# Patient Record
Sex: FEMALE | Race: WHITE | NOT HISPANIC OR LATINO | Employment: OTHER | URBAN - METROPOLITAN AREA
[De-identification: names, ages, dates, MRNs, and addresses within clinical notes are randomized per-mention and may not be internally consistent; named-entity substitution may affect disease eponyms.]

---

## 2020-08-01 ENCOUNTER — OFFICE VISIT (OUTPATIENT)
Dept: URGENT CARE | Facility: URGENT CARE | Age: 70
End: 2020-08-01
Payer: COMMERCIAL

## 2020-08-01 VITALS
OXYGEN SATURATION: 96 % | DIASTOLIC BLOOD PRESSURE: 70 MMHG | SYSTOLIC BLOOD PRESSURE: 150 MMHG | HEART RATE: 115 BPM | TEMPERATURE: 99.5 F

## 2020-08-01 DIAGNOSIS — L23.9 ALLERGIC CONTACT DERMATITIS, UNSPECIFIED TRIGGER: ICD-10-CM

## 2020-08-01 DIAGNOSIS — L08.9 LOCAL INFECTION OF SKIN AND SUBCUTANEOUS TISSUE: Primary | ICD-10-CM

## 2020-08-01 DIAGNOSIS — I10 ESSENTIAL HYPERTENSION: ICD-10-CM

## 2020-08-01 PROCEDURE — 99203 OFFICE O/P NEW LOW 30 MIN: CPT | Performed by: FAMILY MEDICINE

## 2020-08-01 RX ORDER — AMLODIPINE BESYLATE 10 MG/1
TABLET ORAL
COMMUNITY
Start: 2020-05-04 | End: 2021-02-05

## 2020-08-01 RX ORDER — OXYCODONE HYDROCHLORIDE 5 MG/1
TABLET ORAL
COMMUNITY
Start: 2020-07-25 | End: 2021-02-05

## 2020-08-01 RX ORDER — CEPHALEXIN 500 MG/1
500 CAPSULE ORAL 4 TIMES DAILY
Qty: 28 CAPSULE | Refills: 0 | Status: SHIPPED | OUTPATIENT
Start: 2020-08-01 | End: 2020-08-08

## 2020-08-01 RX ORDER — TRIAMCINOLONE ACETONIDE 1 MG/G
CREAM TOPICAL 2 TIMES DAILY
Qty: 28.4 G | Refills: 0 | Status: ON HOLD | OUTPATIENT
Start: 2020-08-01 | End: 2021-03-01

## 2020-08-01 RX ORDER — OXYCODONE HYDROCHLORIDE 5 MG/1
5 TABLET ORAL
COMMUNITY
Start: 2020-07-25 | End: 2021-02-05

## 2020-08-01 RX ORDER — ASPIRIN 81 MG/1
81 TABLET ORAL DAILY
Status: ON HOLD | COMMUNITY
End: 2022-07-13

## 2020-08-01 RX ORDER — TELMISARTAN AND HYDROCHLORTHIAZIDE 80; 12.5 MG/1; MG/1
2 TABLET ORAL EVERY MORNING
COMMUNITY
Start: 2020-07-08

## 2020-08-01 RX ORDER — IBUPROFEN 200 MG
400-600 TABLET ORAL
Status: ON HOLD | COMMUNITY
End: 2022-07-12

## 2020-08-01 RX ORDER — ATORVASTATIN CALCIUM 80 MG/1
80 TABLET, FILM COATED ORAL EVERY EVENING
COMMUNITY
Start: 2019-05-06

## 2020-08-01 RX ORDER — METOPROLOL TARTRATE 50 MG
75 TABLET ORAL 2 TIMES DAILY
COMMUNITY
Start: 2020-05-27

## 2020-08-02 NOTE — PROGRESS NOTES
HPI:Jluis Duke is a 69 year old female here today with complaint of concerns of infection  Patient had cholecystectomy > 1 week ago.  Patient states there has been some redness around the incision.   She saw her doctor for a follow up a few days ago was told likely allergic reaction possibly to the glue     Today seemed more red and bigger.  Because of this patient came in to be seen   Has tried supportive treatment no relief  Because of persistent symptoms patient was brought in to be seen    BP elevated today but asymptomatic. No headache no blurring of vision no chest pain no shortness of breath no dizziness no unsteadiness no numbness no weakness'    Allergies   Allergen Reactions     Codeine Other (See Comments)     Pasted out  Pasted out         No past medical history on file.    amLODIPine (NORVASC) 10 MG tablet, Take 1 tablet by mouth once daily  aspirin 81 MG EC tablet, Take 81 mg by mouth  atorvastatin (LIPITOR) 80 MG tablet, Take 80 mg by mouth  ibuprofen (ADVIL/MOTRIN) 200 MG tablet, Take 400-600 mg by mouth  metoprolol tartrate (LOPRESSOR) 50 MG tablet, TAKE 1 & 1/2 (ONE & ONE-HALF) TABLETS BY MOUTH TWICE DAILY  oxyCODONE (ROXICODONE) 5 MG tablet, Take 5 mg by mouth  oxyCODONE (ROXICODONE) 5 MG tablet,   telmisartan-HCTZ (MICARDIS HCT) 80-12.5 MG tablet, Take 2 tablets by mouth    No current facility-administered medications on file prior to visit.       Social History     Tobacco Use     Smoking status: Not on file   Substance Use Topics     Alcohol use: Not on file     Drug use: Not on file       ROS:  review of systems negative except for noted above.        OBJECTIVE:  BP (!) 150/70   Pulse 115   Temp 99.5  F (37.5  C) (Tympanic)   SpO2 96%    General:   awake, alert, and cooperative.  NAD.   Head: Normocephalic, atraumatic.  Eyes: Conjunctiva clear,   Neuro: Alert and oriented - normal speech.  MS: Using extremities freely  PSYCH:  Normal affect, normal speech  SKIN: erythema warm plaques  around her incision sites about 1-2 cm peripherally, some papules on this plaque small pinpoint and fluid filled, per patient very itchy  No purulent discharge, No fluctuation.     ASSESSMENT:    ICD-10-CM    1. Local infection of skin and subcutaneous tissue  L08.9 cephALEXin (KEFLEX) 500 MG capsule   2. Allergic contact dermatitis, unspecified trigger  L23.9 triamcinolone (KENALOG) 0.1 % external cream   3. Essential hypertension  I10        PLAN:   Suspect contact derm possibly secondarily infected   Prescribed with above  Supportive treatment    Watch for any worsening redness warmth swelling tenderness or inflammation or purulent discharge. If with any of these please come in immediately to be re-evaluated  Please come in immediately also if with any fever or chills  Adverse reactions of medications discussed.  Over the counter medications discussed.   Aware to come back in if with worsening symptoms or if no relief despite treatment plan  Patient voiced understanding and had no further questions.     Patient states she is very anxious today and rushed to make it before close  Your blood pressure reading was elevated today.  Recommend that you have it re-checked either at home or at our clinic within a week  Avoid cold medicines that have pseudoephedrin in it, or Sudafed. You may take regular or plain Robitussin or Mucinex  If you are unsure, please ask the pharmacist    If your blood pressure top number (systolic) 180 and above, or the bottom number (diastolic) 120 and above, you need to be seen immediately.  If persistently elevated top number 140 and above, or the bottom number 90 and above, please schedule an appointment to see a provider in clinic within a week.  If you have very elevated blood pressures, accompanied by headache, chest pain, numbness, weakness, slurring of speech, confusion, difficulty walking, call 911 and go to the ER      Follow up:  surgeon in 3-4 days for incision check  Advised about  symptoms which might herald more serious problems.        Dianna Spring MD

## 2021-01-17 DIAGNOSIS — Z11.59 ENCOUNTER FOR SCREENING FOR OTHER VIRAL DISEASES: ICD-10-CM

## 2021-02-05 RX ORDER — EZETIMIBE 10 MG/1
10 TABLET ORAL EVERY MORNING
COMMUNITY

## 2021-02-06 DIAGNOSIS — Z11.59 ENCOUNTER FOR SCREENING FOR OTHER VIRAL DISEASES: ICD-10-CM

## 2021-02-06 LAB
SARS-COV-2 RNA RESP QL NAA+PROBE: NORMAL
SPECIMEN SOURCE: NORMAL

## 2021-02-06 PROCEDURE — U0003 INFECTIOUS AGENT DETECTION BY NUCLEIC ACID (DNA OR RNA); SEVERE ACUTE RESPIRATORY SYNDROME CORONAVIRUS 2 (SARS-COV-2) (CORONAVIRUS DISEASE [COVID-19]), AMPLIFIED PROBE TECHNIQUE, MAKING USE OF HIGH THROUGHPUT TECHNOLOGIES AS DESCRIBED BY CMS-2020-01-R: HCPCS | Performed by: OPHTHALMOLOGY

## 2021-02-06 PROCEDURE — U0005 INFEC AGEN DETEC AMPLI PROBE: HCPCS | Performed by: OPHTHALMOLOGY

## 2021-02-07 LAB
LABORATORY COMMENT REPORT: NORMAL
SARS-COV-2 RNA RESP QL NAA+PROBE: NEGATIVE
SPECIMEN SOURCE: NORMAL

## 2021-02-09 ENCOUNTER — ANESTHESIA EVENT (OUTPATIENT)
Dept: SURGERY | Facility: CLINIC | Age: 71
End: 2021-02-09
Payer: COMMERCIAL

## 2021-02-09 NOTE — ANESTHESIA PREPROCEDURE EVALUATION
Anesthesia Pre-Procedure Evaluation    Patient: Jluis Duke   MRN: [id number] : 1950        Preoperative Diagnosis: Cataract [H26.9]   Procedure : Procedure(s):  Cataract removal with implant.     No past medical history on file.   No past surgical history on file.   Allergies   Allergen Reactions     Codeine Other (See Comments)     Pasted out  Pasted out        Social History     Tobacco Use     Smoking status: Not on file   Substance Use Topics     Alcohol use: Not on file      Wt Readings from Last 1 Encounters:   No data found for Wt        Anesthesia Evaluation   Pt has had prior anesthetic. Type: General.    No history of anesthetic complications       ROS/MED HX  ENT/Pulmonary: Comment: Hearing loss    (+) sleep apnea, uses CPAP,     Neurologic:  - neg neurologic ROS     Cardiovascular:     (+) Dyslipidemia hypertension--CAD ---    METS/Exercise Tolerance:     Hematologic:  - neg hematologic  ROS     Musculoskeletal:   (+) arthritis,     GI/Hepatic:  - neg GI/hepatic ROS  (-) esophageal disease   Renal/Genitourinary:  - neg Renal ROS     Endo: Comment: Super morbid obesity    (+) type II DM, Not using insulin, - not using insulin pump. Obesity (Extreme morbid),     Psychiatric/Substance Use:  - neg psychiatric ROS     Infectious Disease:  - neg infectious disease ROS     Malignancy:  - neg malignancy ROS     Other:  - neg other ROS          Physical Exam    Airway        Mallampati: II   TM distance: > 3 FB   Neck ROM: full   Mouth opening: > 3 cm    Respiratory Devices and Support         Dental  no notable dental history         Cardiovascular   cardiovascular exam normal          Pulmonary   pulmonary exam normal                OUTSIDE LABS:  CBC: No results found for: WBC, HGB, HCT, PLT  BMP: No results found for: NA, POTASSIUM, CHLORIDE, CO2, BUN, CR, GLC  COAGS: No results found for: PTT, INR, FIBR  POC: No results found for: BGM, HCG, HCGS  HEPATIC: No results found for: ALBUMIN,  PROTTOTAL, ALT, AST, GGT, ALKPHOS, BILITOTAL, BILIDIRECT, DAVE  OTHER: No results found for: PH, LACT, A1C, THELMA, PHOS, MAG, LIPASE, AMYLASE, TSH, T4, T3, CRP, SED    Anesthesia Plan    ASA Status:  3   NPO Status:  NPO Appropriate    Anesthesia Type: MAC.     - Reason for MAC: Difficulty with conscious sedation (QS), Procedure to face, neck, head or breast      Maintenance: Balanced.        Consents    Anesthesia Plan(s) and associated risks, benefits, and realistic alternatives discussed. Questions answered and patient/representative(s) expressed understanding.     - Discussed with:  Patient         Postoperative Care            Comments:                LANDON Pryor CRNA

## 2021-02-10 ENCOUNTER — ANESTHESIA (OUTPATIENT)
Dept: SURGERY | Facility: CLINIC | Age: 71
End: 2021-02-10
Payer: COMMERCIAL

## 2021-02-10 ENCOUNTER — HOSPITAL ENCOUNTER (OUTPATIENT)
Facility: CLINIC | Age: 71
Discharge: HOME | End: 2021-02-10
Attending: OPHTHALMOLOGY | Admitting: OPHTHALMOLOGY
Payer: COMMERCIAL

## 2021-02-10 VITALS
SYSTOLIC BLOOD PRESSURE: 137 MMHG | DIASTOLIC BLOOD PRESSURE: 74 MMHG | TEMPERATURE: 98.1 F | HEIGHT: 63 IN | HEART RATE: 66 BPM | WEIGHT: 288 LBS | BODY MASS INDEX: 51.03 KG/M2 | OXYGEN SATURATION: 96 % | RESPIRATION RATE: 16 BRPM

## 2021-02-10 PROCEDURE — 258N000003 HC RX IP 258 OP 636: Performed by: NURSE ANESTHETIST, CERTIFIED REGISTERED

## 2021-02-10 PROCEDURE — 250N000011 HC RX IP 250 OP 636: Performed by: OPHTHALMOLOGY

## 2021-02-10 PROCEDURE — 250N000011 HC RX IP 250 OP 636: Performed by: NURSE ANESTHETIST, CERTIFIED REGISTERED

## 2021-02-10 PROCEDURE — 761N000008 HC RECOVERY CATRACT PACKAGE: Performed by: OPHTHALMOLOGY

## 2021-02-10 PROCEDURE — V2632 POST CHMBR INTRAOCULAR LENS: HCPCS | Performed by: OPHTHALMOLOGY

## 2021-02-10 PROCEDURE — 360N000007 HC CATARACT SURGICAL PACKAGE: Performed by: OPHTHALMOLOGY

## 2021-02-10 PROCEDURE — 250N000009 HC RX 250: Performed by: OPHTHALMOLOGY

## 2021-02-10 PROCEDURE — 370N000004 HC ANESTHESIA CATARACT PACKAGE: Performed by: OPHTHALMOLOGY

## 2021-02-10 DEVICE — EYE IMP IOL AMO PCL TECNIS ZCB00 19.0: Type: IMPLANTABLE DEVICE | Site: EYE | Status: FUNCTIONAL

## 2021-02-10 RX ORDER — TROPICAMIDE 10 MG/ML
1 SOLUTION/ DROPS OPHTHALMIC
Status: COMPLETED | OUTPATIENT
Start: 2021-02-10 | End: 2021-02-10

## 2021-02-10 RX ORDER — PROPARACAINE HYDROCHLORIDE 5 MG/ML
SOLUTION/ DROPS OPHTHALMIC PRN
Status: DISCONTINUED | OUTPATIENT
Start: 2021-02-10 | End: 2021-02-10 | Stop reason: HOSPADM

## 2021-02-10 RX ORDER — LIDOCAINE 40 MG/G
CREAM TOPICAL
Status: DISCONTINUED | OUTPATIENT
Start: 2021-02-10 | End: 2021-02-10 | Stop reason: HOSPADM

## 2021-02-10 RX ORDER — LIDOCAINE HYDROCHLORIDE 20 MG/ML
JELLY TOPICAL PRN
Status: DISCONTINUED | OUTPATIENT
Start: 2021-02-10 | End: 2021-02-10 | Stop reason: HOSPADM

## 2021-02-10 RX ORDER — BALANCED SALT SOLUTION 6.4; .75; .48; .3; 3.9; 1.7 MG/ML; MG/ML; MG/ML; MG/ML; MG/ML; MG/ML
SOLUTION OPHTHALMIC PRN
Status: DISCONTINUED | OUTPATIENT
Start: 2021-02-10 | End: 2021-02-10 | Stop reason: HOSPADM

## 2021-02-10 RX ORDER — SODIUM CHLORIDE, SODIUM LACTATE, POTASSIUM CHLORIDE, CALCIUM CHLORIDE 600; 310; 30; 20 MG/100ML; MG/100ML; MG/100ML; MG/100ML
INJECTION, SOLUTION INTRAVENOUS CONTINUOUS
Status: DISCONTINUED | OUTPATIENT
Start: 2021-02-10 | End: 2021-02-10 | Stop reason: HOSPADM

## 2021-02-10 RX ADMIN — TROPICAMIDE 1 DROP: 10 SOLUTION/ DROPS OPHTHALMIC at 09:04

## 2021-02-10 RX ADMIN — SODIUM CHLORIDE, POTASSIUM CHLORIDE, SODIUM LACTATE AND CALCIUM CHLORIDE: 600; 310; 30; 20 INJECTION, SOLUTION INTRAVENOUS at 09:48

## 2021-02-10 RX ADMIN — CYCLOPENTOLATE HYDROCHLORIDE AND PHENYLEPHRINE HYDROCHLORIDE 1 DROP: 2; 10 SOLUTION/ DROPS OPHTHALMIC at 09:13

## 2021-02-10 RX ADMIN — CYCLOPENTOLATE HYDROCHLORIDE AND PHENYLEPHRINE HYDROCHLORIDE 1 DROP: 2; 10 SOLUTION/ DROPS OPHTHALMIC at 09:21

## 2021-02-10 RX ADMIN — MIDAZOLAM 2 MG: 1 INJECTION INTRAMUSCULAR; INTRAVENOUS at 09:50

## 2021-02-10 RX ADMIN — CYCLOPENTOLATE HYDROCHLORIDE AND PHENYLEPHRINE HYDROCHLORIDE 1 DROP: 2; 10 SOLUTION/ DROPS OPHTHALMIC at 09:04

## 2021-02-10 RX ADMIN — TROPICAMIDE 1 DROP: 10 SOLUTION/ DROPS OPHTHALMIC at 09:13

## 2021-02-10 RX ADMIN — TROPICAMIDE 1 DROP: 10 SOLUTION/ DROPS OPHTHALMIC at 09:21

## 2021-02-10 ASSESSMENT — MIFFLIN-ST. JEOR: SCORE: 1777.55

## 2021-02-10 NOTE — ANESTHESIA CARE TRANSFER NOTE
Patient: Jluis HASSAN Srikanth    Procedure(s):  Cataract removal with implant.    Diagnosis: Cataract [H26.9]  Diagnosis Additional Information: No value filed.    Anesthesia Type:   MAC     Note:    Oropharynx: oropharynx clear of all foreign objects  Level of Consciousness: awake  Oxygen Supplementation: room air    Independent Airway: airway patency satisfactory and stable  Dentition: dentition unchanged  Vital Signs Stable: post-procedure vital signs reviewed and stable  Report to RN Given: handoff report given  Patient transferred to: Phase II          Vitals: (Last set prior to Anesthesia Care Transfer)  CRNA VITALS  2/10/2021 0923 - 2/10/2021 0953      2/10/2021             Pulse:  76    SpO2:  95 %        Electronically Signed By: LANDON Luis CRNA  February 10, 2021  9:53 AM

## 2021-02-10 NOTE — OP NOTE
CATARACT OPERATIVE NOTE    PATIENT: Jluis Duke  DATE OF SURGERY: 2/10/2021  PREOPERATIVE DIAGNOSIS:  Senile Nuclear Cataract, Left eye  POSTOPERATIVE DIAGNOSIS:  Senile Nuclear Cataract, Left eye  OPERATIVE PROCEDURE:  Phacoemulsification and placement of intraocular lens  SURGEON:  Loyd Marquez MD  ANESTHESIA:  Topical / MAC  EBL:  None  SPECIMENS:  None  COMPLICATIONS:  None    PROCEDURE:  The patient was brought to the operating room at [facility].  The left eye was prepped and draped in the usual fashion for cataract surgery.  A wire lid speculum was inserted.  A super sharp blade was used to make a paracentesis at the 5 O'clock position.  The super sharp blade was used to make a partial thickness temporal groove, which was 3 mm in length.  0.8 mL of non-preserved epi-Shugarcaine was injected into the anterior chamber.  Viscoelastic was used to inflate the anterior chamber through a cannula.  A 2.5 mm microkeratome was used to make a temporal clear corneal incision in a two-plane fashion.  A cystotome needle and forceps were used to make a capsulorrhexis.  Hydrodissection and hydrodelineation were performed with Balance Salt Solution.  The lens was then phacoemulsified and removed without complications.  The cortical material was removed with bimanual irrigation and aspiration.  The capsular bag was filled with viscoelastic.  A posterior chamber intraocular lens, preselected and recorded, was folded and inserted into the capsular bag.  The viscoelastic was removed with the irrigation and aspiration tip.  Balanced Salt Solution with Vigamox, 150mg/0.1mL, was used to refill the anterior chamber.  The wounds were checked for water tightness and required no suture.  The wire lid speculum was removed.  The patient's left eye was cleaned and a drop of each post-operative drop was placed, followed by a orantes shield.  The patient tolerated the procedure well, and there were no  complications.      Loyd Marquez MD

## 2021-02-10 NOTE — H&P
[facility]  TOTAL EYE CARE  [address]  [facility] [address]  [phone]  Dept: [phone]    OPHTHALMOLOGY PRE-OPERATIVE  HISTORY AND PHYSICAL    DATE OF H/P:  2021    DATE OF SURGERY:  February 10, 2021  PROCEDURE:  Procedure(s):  Cataract removal with implant., Left Eye  LENS IMPLANT:  [id number] +19.0  REFRACTIVE GOAL:  PL Sph  SURGEON:  Loyd Marquez MD    ANESTHESIA:  TOPICAL / MAC    OR CASE REQUIREMENTS:    DEMOGRAPHICS:  Demographic Information on Jluis Duke:    Jluis Duke  Gender: female  : 1950  [address] [address] [address]  [phone] (home)     Medical Record: [id number]  Social Security Number: [id number]  Pharmacy: [facility]  [address]  Primary Care Provider: Olivia Aviles    Parent's names are: Data Unavailable (mother) and Data Unavailable (father).    Insurance: Payor: [facility] / Plan: BCBS MEDICARE ADVANTAGE / Product Type: Medicare /     OCULAR HISTORY:  Cataracts, each eye.    HISTORIES:  Type II diabetes, sleep apnea.    No past medical history on file.    No past surgical history on file.    No family history on file.    Social History     Tobacco Use     Smoking status: Not on file   Substance Use Topics     Alcohol use: Not on file       MEDICATIONS:  No current facility-administered medications for this encounter.      Current Outpatient Medications   Medication Sig     empagliflozin (JARDIANCE) 10 MG TABS tablet Take 10 mg by mouth daily     ezetimibe (ZETIA) 10 MG tablet Take 10 mg by mouth daily     aspirin 81 MG EC tablet Take 81 mg by mouth     atorvastatin (LIPITOR) 80 MG tablet Take 80 mg by mouth     ibuprofen (ADVIL/MOTRIN) 200 MG tablet Take 400-600 mg by mouth     metoprolol tartrate (LOPRESSOR) 50 MG tablet TAKE 1 & 1/2 (ONE & ONE-HALF) TABLETS BY MOUTH TWICE DAILY     telmisartan-HCTZ (MICARDIS HCT) 80-12.5 MG tablet Take 2 tablets by mouth      triamcinolone (KENALOG) 0.1 % external cream Apply topically 2 times daily For up to 2 weeks or until 2 days after clear       ALLERGIES:     Allergies   Allergen Reactions     Codeine Other (See Comments)     Pasted out  Pasted out         PERTINENT SYSTEMS REVIEW:    1. No - Do you have a history of heart attack, stroke, stent, bypass or surgery on an artery in the head, neck, heart or legs?  2. No - Do you ever have any pain or discomfort in your chest?  3. No - Do you have a history of  Heart Failure?  4. No - Are you troubled by shortness of breath when walking: On the level, up a slight hill or at night?  5. No - Do you currently have a cold, bronchitis or other respiratory infection?  6. No - Do you have a cough, shortness of breath or wheezing?  7. No - Do you sometimes get pains in the calves of your legs when you walk?  8. No - Do you or anyone in your family have previous history of blood clots?  9. No - Do you or does anyone in your family have a serious bleeding problem such as prolonged bleeding following surgeries or cuts?  10. No - Have you ever had problems with anemia or been told to take iron pills?  11. No - Have you had any abnormal blood loss such as black, tarry or bloody stools, or abnormal vaginal bleeding?  12. No - Have you ever had a blood transfusion?  13. No - Have you or any of your relatives ever had problems with anesthesia?  14. Yes: CPAP - Do you have sleep apnea, excessive snoring or daytime drowsiness?  15. No - Do you have any prosthetic heart valves?  16. Yes: knee replacements - Do you have prosthetic joints?    EXAMINATION:  Vitals were reviewed                     Vison:  Va, right - 20/25, left - 20/40;   BAT, right - 20/70, left - 20/100;  HEENT:  Cataract, otherwise unremarkable.  LUNGS:  Clear  CV:  Regular rate and rhythm without murmur  ABD:  Soft and nontender  NEURO:  Alert and nonfocal    IMPRESSION:  Patient cleared for ophthalmic surgery.  Low risk with monitored,  light sedation.  I have assessed the patient's DVT risk, and no additional orders necessary.    PLAN:  Procedure(s):  Cataract removal with implant., Left Eye      Loyd Marquez MD

## 2021-02-10 NOTE — ANESTHESIA POSTPROCEDURE EVALUATION
Patient: Jluis Duke    Procedure(s):  Cataract removal with implant.    Diagnosis:Cataract [H26.9]  Diagnosis Additional Information: No value filed.    Anesthesia Type:  MAC    Note:  Disposition: Outpatient   Postop Pain Control: Uneventful            Sign Out: Well controlled pain   PONV: No   Neuro/Psych: Uneventful            Sign Out: Acceptable/Baseline neuro status   Airway/Respiratory:    CV/Hemodynamics: Uneventful            Sign Out: Acceptable CV status   Other NRE: NONE   DID A NON-ROUTINE EVENT OCCUR?          Last vitals:  Vitals:    02/10/21 0700   BP: (!) 156/82   Pulse: 69   Resp: 16   Temp: 37.1  C (98.8  F)   SpO2: 96%       Last vitals prior to Anesthesia Care Transfer:  CRNA VITALS  2/10/2021 0924 - 2/10/2021 0954      2/10/2021             Pulse:  76    SpO2:  95 %          Electronically Signed By: LANDON Luis CRNA  February 10, 2021  9:54 AM

## 2021-02-14 DIAGNOSIS — Z11.59 ENCOUNTER FOR SCREENING FOR OTHER VIRAL DISEASES: ICD-10-CM

## 2021-02-23 ENCOUNTER — ANESTHESIA EVENT (OUTPATIENT)
Dept: SURGERY | Facility: CLINIC | Age: 71
End: 2021-02-23
Payer: COMMERCIAL

## 2021-02-23 NOTE — ANESTHESIA PREPROCEDURE EVALUATION
Anesthesia Pre-Procedure Evaluation    Patient: Jluis Duke   MRN: [id number] : 1950        Preoperative Diagnosis: Cataract [H26.9]   Procedure : Procedure(s):  Cataract removal with implant.     Past Medical History:   Diagnosis Date     Hypertension       Past Surgical History:   Procedure Laterality Date     CHOLECYSTECTOMY       ORTHOPEDIC SURGERY       PHACOEMULSIFICATION WITH STANDARD INTRAOCULAR LENS IMPLANT Left 2/10/2021    Procedure: Cataract removal with implant.;  Surgeon: Loyd Marquez MD;  Location: [facility] OR      Allergies   Allergen Reactions     Codeine Other (See Comments)     Pasted out  Pasted out        Social History     Tobacco Use     Smoking status: Never Smoker     Smokeless tobacco: Never Used   Substance Use Topics     Alcohol use: Not Currently      Wt Readings from Last 1 Encounters:   02/10/21 130.6 kg (288 lb)        Anesthesia Evaluation   Pt has had prior anesthetic. Type: General.    No history of anesthetic complications       ROS/MED HX  ENT/Pulmonary: Comment: Hearing loss    (+) sleep apnea, uses CPAP,     Neurologic:  - neg neurologic ROS     Cardiovascular:     (+) Dyslipidemia hypertension--CAD ---    METS/Exercise Tolerance:     Hematologic:  - neg hematologic  ROS     Musculoskeletal:   (+) arthritis,     GI/Hepatic:  - neg GI/hepatic ROS   (+) cholecystitis/cholelithiasis,  (-) esophageal disease   Renal/Genitourinary:  - neg Renal ROS     Endo: Comment: Super morbid obesity    (+) type II DM, Not using insulin, - not using insulin pump. Obesity (Extreme morbid),     Psychiatric/Substance Use:  - neg psychiatric ROS     Infectious Disease:  - neg infectious disease ROS     Malignancy:  - neg malignancy ROS     Other:  - neg other ROS          Physical Exam    Airway        Mallampati: I   TM distance: > 3 FB   Neck ROM: full   Mouth opening: > 3 cm    Respiratory Devices and Support         Dental  no notable dental history         Cardiovascular    cardiovascular exam normal          Pulmonary   pulmonary exam normal                OUTSIDE LABS:  CBC: No results found for: WBC, HGB, HCT, PLT  BMP: No results found for: NA, POTASSIUM, CHLORIDE, CO2, BUN, CR, GLC  COAGS: No results found for: PTT, INR, FIBR  POC: No results found for: BGM, HCG, HCGS  HEPATIC: No results found for: ALBUMIN, PROTTOTAL, ALT, AST, GGT, ALKPHOS, BILITOTAL, BILIDIRECT, DAVE  OTHER: No results found for: PH, LACT, A1C, THELMA, PHOS, MAG, LIPASE, AMYLASE, TSH, T4, T3, CRP, SED    Anesthesia Plan    ASA Status:  3   NPO Status:  NPO Appropriate    Anesthesia Type: MAC.     - Reason for MAC: immobility needed              Consents    Anesthesia Plan(s) and associated risks, benefits, and realistic alternatives discussed. Questions answered and patient/representative(s) expressed understanding.     - Discussed with:  Patient         Postoperative Care            Comments:         H&P reviewed: Unable to attach H&P to encounter due to EHR limitations. H&P Update: appropriate H&P reviewed, patient examined. No interval changes since H&P (within 30 days).         Sera Moses, CRNA, APRN CRNA

## 2021-02-26 DIAGNOSIS — Z11.59 ENCOUNTER FOR SCREENING FOR OTHER VIRAL DISEASES: ICD-10-CM

## 2021-02-26 LAB
SARS-COV-2 RNA RESP QL NAA+PROBE: NORMAL
SPECIMEN SOURCE: NORMAL

## 2021-02-26 PROCEDURE — U0003 INFECTIOUS AGENT DETECTION BY NUCLEIC ACID (DNA OR RNA); SEVERE ACUTE RESPIRATORY SYNDROME CORONAVIRUS 2 (SARS-COV-2) (CORONAVIRUS DISEASE [COVID-19]), AMPLIFIED PROBE TECHNIQUE, MAKING USE OF HIGH THROUGHPUT TECHNOLOGIES AS DESCRIBED BY CMS-2020-01-R: HCPCS | Performed by: OPHTHALMOLOGY

## 2021-02-26 PROCEDURE — U0005 INFEC AGEN DETEC AMPLI PROBE: HCPCS | Performed by: OPHTHALMOLOGY

## 2021-02-26 NOTE — H&P
[facility]  TOTAL EYE CARE  [address]  [facility] [address]  [phone]  Dept: [phone]    OPHTHALMOLOGY PRE-OPERATIVE  HISTORY AND PHYSICAL    DATE OF H/P:  2021    DATE OF SURGERY:  2021  PROCEDURE:  Procedure(s):  Cataract removal with implant., Right Eye  LENS IMPLANT:  ZCB00 +19.5  REFRACTIVE GOAL:  PL Sph  SURGEON:  Loyd Marquez MD    ANESTHESIA:  TOPICAL / MAC    OR CASE REQUIREMENTS:    DEMOGRAPHICS:  Demographic Information on Jluis Duke:    Jluis Duke  Gender: female  : 1950  [address] [address]  [facility] [address]  [phone] (home)     Medical Record: [id number]  Social Security Number: [id number]  Pharmacy: [facility]  [address]  Primary Care Provider: Olivia Aviles    Parent's names are: Data Unavailable (mother) and Data Unavailable (father).    Insurance: Payor: [employer] / Plan: BCBS MEDICARE ADVANTAGE / Product Type: Medicare /     OCULAR HISTORY:  Cataracts, s/p IOL left eye.    HISTORIES:  Past Medical History:   Diagnosis Date     Diabetes (H)      Hypertension      Sleep apnea        Past Surgical History:   Procedure Laterality Date     CHOLECYSTECTOMY       ORTHOPEDIC SURGERY       PHACOEMULSIFICATION WITH STANDARD INTRAOCULAR LENS IMPLANT Left 2/10/2021    Procedure: Cataract removal with implant.;  Surgeon: Loyd Marquez MD;  Location: [facility] OR       History reviewed. No pertinent family history.    Social History     Tobacco Use     Smoking status: Never Smoker     Smokeless tobacco: Never Used   Substance Use Topics     Alcohol use: Not Currently       MEDICATIONS:  No current facility-administered medications for this encounter.      Current Outpatient Medications   Medication Sig     aspirin 81 MG EC tablet Take 81 mg by mouth     atorvastatin (LIPITOR) 80 MG tablet Take 80 mg by mouth     empagliflozin (JARDIANCE) 10 MG TABS tablet Take 10 mg by mouth  daily     ezetimibe (ZETIA) 10 MG tablet Take 10 mg by mouth daily     metoprolol tartrate (LOPRESSOR) 50 MG tablet TAKE 1 & 1/2 (ONE & ONE-HALF) TABLETS BY MOUTH TWICE DAILY     ibuprofen (ADVIL/MOTRIN) 200 MG tablet Take 400-600 mg by mouth     telmisartan-HCTZ (MICARDIS HCT) 80-12.5 MG tablet Take 2 tablets by mouth     triamcinolone (KENALOG) 0.1 % external cream Apply topically 2 times daily For up to 2 weeks or until 2 days after clear       ALLERGIES:     Allergies   Allergen Reactions     Codeine Other (See Comments)     Pasted out  Pasted out         PERTINENT SYSTEMS REVIEW:    1. No - Do you have a history of heart attack, stroke, stent, bypass or surgery on an artery in the head, neck, heart or legs?  2. No - Do you ever have any pain or discomfort in your chest?  3. No - Do you have a history of  Heart Failure?  4. No - Are you troubled by shortness of breath when walking: On the level, up a slight hill or at night?  5. No - Do you currently have a cold, bronchitis or other respiratory infection?  6. No - Do you have a cough, shortness of breath or wheezing?  7. No - Do you sometimes get pains in the calves of your legs when you walk?  8. No - Do you or anyone in your family have previous history of blood clots?  9. No - Do you or does anyone in your family have a serious bleeding problem such as prolonged bleeding following surgeries or cuts?  10. No - Have you ever had problems with anemia or been told to take iron pills?  11. No - Have you had any abnormal blood loss such as black, tarry or bloody stools, or abnormal vaginal bleeding?  12. No - Have you ever had a blood transfusion?  13. No - Have you or any of your relatives ever had problems with anesthesia?  14. Yes: CPAP - Do you have sleep apnea, excessive snoring or daytime drowsiness?  15. No - Do you have any prosthetic heart valves?  16. No - Do you have prosthetic joints?    EXAMINATION:  Vitals were reviewed                     Vison:  Va,  right - 20/25;   BAT, right - 20/70;  HEENT:  Cataract, otherwise unremarkable.  LUNGS:  Clear  CV:  Regular rate and rhythm without murmur  ABD:  Soft and nontender  NEURO:  Alert and nonfocal    IMPRESSION:  Patient cleared for ophthalmic surgery.  Low risk with monitored, light sedation.  I have assessed the patient's DVT risk, and no additional orders necessary.    PLAN:  Procedure(s):  Cataract removal with implant., Right Eye      Loyd Marquez MD

## 2021-03-01 ENCOUNTER — HOSPITAL ENCOUNTER (OUTPATIENT)
Facility: CLINIC | Age: 71
Discharge: HOME | End: 2021-03-01
Attending: OPHTHALMOLOGY | Admitting: OPHTHALMOLOGY
Payer: COMMERCIAL

## 2021-03-01 ENCOUNTER — ANESTHESIA (OUTPATIENT)
Dept: SURGERY | Facility: CLINIC | Age: 71
End: 2021-03-01
Payer: COMMERCIAL

## 2021-03-01 VITALS
SYSTOLIC BLOOD PRESSURE: 116 MMHG | RESPIRATION RATE: 20 BRPM | TEMPERATURE: 98.2 F | DIASTOLIC BLOOD PRESSURE: 77 MMHG | HEART RATE: 68 BPM | BODY MASS INDEX: 53 KG/M2 | WEIGHT: 288 LBS | OXYGEN SATURATION: 97 % | HEIGHT: 62 IN

## 2021-03-01 PROCEDURE — V2632 POST CHMBR INTRAOCULAR LENS: HCPCS | Performed by: OPHTHALMOLOGY

## 2021-03-01 PROCEDURE — 250N000009 HC RX 250: Performed by: NURSE ANESTHETIST, CERTIFIED REGISTERED

## 2021-03-01 PROCEDURE — 250N000011 HC RX IP 250 OP 636: Performed by: NURSE ANESTHETIST, CERTIFIED REGISTERED

## 2021-03-01 PROCEDURE — 258N000003 HC RX IP 258 OP 636: Performed by: NURSE ANESTHETIST, CERTIFIED REGISTERED

## 2021-03-01 PROCEDURE — 360N000007 HC CATARACT SURGICAL PACKAGE: Performed by: OPHTHALMOLOGY

## 2021-03-01 PROCEDURE — 250N000011 HC RX IP 250 OP 636: Performed by: OPHTHALMOLOGY

## 2021-03-01 PROCEDURE — 250N000009 HC RX 250: Performed by: OPHTHALMOLOGY

## 2021-03-01 PROCEDURE — 761N000008 HC RECOVERY CATRACT PACKAGE: Performed by: OPHTHALMOLOGY

## 2021-03-01 PROCEDURE — 370N000004 HC ANESTHESIA CATARACT PACKAGE: Performed by: OPHTHALMOLOGY

## 2021-03-01 DEVICE — EYE IMP IOL AMO PCL TECNIS ZCB00 19.5: Type: IMPLANTABLE DEVICE | Site: EYE | Status: FUNCTIONAL

## 2021-03-01 RX ORDER — PROPARACAINE HYDROCHLORIDE 5 MG/ML
SOLUTION/ DROPS OPHTHALMIC PRN
Status: DISCONTINUED | OUTPATIENT
Start: 2021-03-01 | End: 2021-03-01 | Stop reason: HOSPADM

## 2021-03-01 RX ORDER — SODIUM CHLORIDE, SODIUM LACTATE, POTASSIUM CHLORIDE, CALCIUM CHLORIDE 600; 310; 30; 20 MG/100ML; MG/100ML; MG/100ML; MG/100ML
INJECTION, SOLUTION INTRAVENOUS CONTINUOUS
Status: DISCONTINUED | OUTPATIENT
Start: 2021-03-01 | End: 2021-03-01 | Stop reason: HOSPADM

## 2021-03-01 RX ORDER — LIDOCAINE HYDROCHLORIDE 20 MG/ML
JELLY TOPICAL PRN
Status: DISCONTINUED | OUTPATIENT
Start: 2021-03-01 | End: 2021-03-01 | Stop reason: HOSPADM

## 2021-03-01 RX ORDER — AMOXICILLIN 500 MG
2400 CAPSULE ORAL DAILY
COMMUNITY

## 2021-03-01 RX ORDER — LIDOCAINE 40 MG/G
CREAM TOPICAL
Status: DISCONTINUED | OUTPATIENT
Start: 2021-03-01 | End: 2021-03-01 | Stop reason: HOSPADM

## 2021-03-01 RX ORDER — TROPICAMIDE 10 MG/ML
1 SOLUTION/ DROPS OPHTHALMIC
Status: COMPLETED | OUTPATIENT
Start: 2021-03-01 | End: 2021-03-01

## 2021-03-01 RX ORDER — SODIUM CHLORIDE, SODIUM LACTATE, POTASSIUM CHLORIDE, CALCIUM CHLORIDE 600; 310; 30; 20 MG/100ML; MG/100ML; MG/100ML; MG/100ML
INJECTION, SOLUTION INTRAVENOUS CONTINUOUS
Status: CANCELLED | OUTPATIENT
Start: 2021-03-01

## 2021-03-01 RX ORDER — BALANCED SALT SOLUTION 6.4; .75; .48; .3; 3.9; 1.7 MG/ML; MG/ML; MG/ML; MG/ML; MG/ML; MG/ML
SOLUTION OPHTHALMIC PRN
Status: DISCONTINUED | OUTPATIENT
Start: 2021-03-01 | End: 2021-03-01 | Stop reason: HOSPADM

## 2021-03-01 RX ADMIN — CYCLOPENTOLATE HYDROCHLORIDE AND PHENYLEPHRINE HYDROCHLORIDE 1 DROP: 2; 10 SOLUTION/ DROPS OPHTHALMIC at 10:03

## 2021-03-01 RX ADMIN — CYCLOPENTOLATE HYDROCHLORIDE AND PHENYLEPHRINE HYDROCHLORIDE 1 DROP: 2; 10 SOLUTION/ DROPS OPHTHALMIC at 09:56

## 2021-03-01 RX ADMIN — TROPICAMIDE 1 DROP: 10 SOLUTION/ DROPS OPHTHALMIC at 10:07

## 2021-03-01 RX ADMIN — TROPICAMIDE 1 DROP: 10 SOLUTION/ DROPS OPHTHALMIC at 10:02

## 2021-03-01 RX ADMIN — TROPICAMIDE 1 DROP: 10 SOLUTION/ DROPS OPHTHALMIC at 09:54

## 2021-03-01 RX ADMIN — MIDAZOLAM 2 MG: 1 INJECTION INTRAMUSCULAR; INTRAVENOUS at 10:27

## 2021-03-01 RX ADMIN — CYCLOPENTOLATE HYDROCHLORIDE AND PHENYLEPHRINE HYDROCHLORIDE 1 DROP: 2; 10 SOLUTION/ DROPS OPHTHALMIC at 10:08

## 2021-03-01 RX ADMIN — LIDOCAINE HYDROCHLORIDE 0.1 ML: 10 INJECTION, SOLUTION EPIDURAL; INFILTRATION; INTRACAUDAL; PERINEURAL at 10:14

## 2021-03-01 RX ADMIN — SODIUM CHLORIDE, POTASSIUM CHLORIDE, SODIUM LACTATE AND CALCIUM CHLORIDE: 600; 310; 30; 20 INJECTION, SOLUTION INTRAVENOUS at 10:15

## 2021-03-01 ASSESSMENT — MIFFLIN-ST. JEOR: SCORE: 1769.61

## 2021-03-01 NOTE — ANESTHESIA CARE TRANSFER NOTE
Patient: Jluis Duke    Procedure(s):  Cataract removal with implant.    Diagnosis: Cataract [H26.9]  Diagnosis Additional Information: No value filed.    Anesthesia Type:   MAC     Note:    Oropharynx: oropharynx clear of all foreign objects  Level of Consciousness: awake      Independent Airway: airway patency satisfactory and stable  Dentition: dentition unchanged  Vital Signs Stable: post-procedure vital signs reviewed and stable  Report to RN Given: handoff report given  Patient transferred to: Phase II    Handoff Report: Identifed the Patient, Identified the Reponsible Provider, Reviewed the pertinent medical history, Discussed the surgical course, Reviewed Intra-OP anesthesia mangement and issues during anesthesia, Set expectations for post-procedure period and Allowed opportunity for questions and acknowledgement of understanding      Vitals: (Last set prior to Anesthesia Care Transfer)  CRNA VITALS  3/1/2021 1013 - 3/1/2021 1044      3/1/2021             Pulse:  68    SpO2:  94 %        Electronically Signed By: LANDON Lozoya CRNA  March 1, 2021  10:44 AM

## 2021-03-01 NOTE — OP NOTE
OPHTHALMOLOGY OPERATIVE NOTE    PATIENT: Jluis Duke  DATE OF SURGERY: 3/1/2021  PREOPERATIVE DIAGNOSIS:  Senile Nuclear Cataract, Right eye  POSTOPERATIVE DIAGNOSIS:  Senile Nuclear Cataract, Right eye  OPERATIVE PROCEDURE:  Phacoemulsification with placement of intraocular lens  SURGEON:  Loyd Marquez MD  ANESTHESIA:  Topical / MAC  EBL:  None  SPECIMENS:  None  COMPLICATIONS:  None    PROCEDURE:  The patient was brought to the operating room at [facility].  The right eye was prepped and draped in the usual fashion for cataract surgery.  A wire lid speculum was inserted.  A super sharp blade was used to make a paracentesis at the 11 O'clock position.  The super sharp blade was used to make a partial thickness temporal groove, which was 3 mm in length.  0.8 mL of non-preserved epi-Shugarcaine was injected into the anterior chamber.  Viscoelastic was used to inflate the anterior chamber through a cannula.  A 2.5 mm microkeratome was used to make a temporal clear corneal incision in a two-plane fashion.  A cystotome needle and forceps were used to make a capsulorrhexis.  Hydrodissection and hydrodelineation were performed with Balance Salt Solution.  The lens was then phacoemulsified and removed without complications.  The cortical material was removed with bimanual irrigation and aspiration.  The capsular bag was filled with viscoelastic.  A posterior chamber intraocular lens, preselected and recorded, was folded and inserted into the capsular bag.  The viscoelastic was removed with the irrigation and aspiration tip.  Balanced Salt Solution with Vigamox, 150mg/0.1mL, was used to refill the anterior chamber.  The wounds were checked for water tightness and required no suture.  The wire lid speculum was removed.  The patient's right eye was cleaned and a drop of each post-operative drop was placed, followed by a orantes shield.  The patient tolerated the procedure well, and there  were no complications.      Loyd Marquez MD

## 2021-03-01 NOTE — ANESTHESIA POSTPROCEDURE EVALUATION
Patient: Jluis Duke    Procedure(s):  Cataract removal with implant.    Diagnosis:Cataract [H26.9]  Diagnosis Additional Information: No value filed.    Anesthesia Type:  MAC    Note:  Disposition: Outpatient   Postop Pain Control: Uneventful            Sign Out: Well controlled pain   PONV: No   Neuro/Psych: Uneventful            Sign Out: Acceptable/Baseline neuro status   Airway/Respiratory: Uneventful            Sign Out: Acceptable/Baseline resp. status   CV/Hemodynamics: Uneventful            Sign Out: Acceptable CV status   Other NRE: NONE   DID A NON-ROUTINE EVENT OCCUR? No         Last vitals:  Vitals:    03/01/21 0939 03/01/21 0953 03/01/21 1051   BP: (!) 160/113 (!) 147/91 116/77   Pulse:   74   Resp: 18 16 16   Temp: 36.8  C (98.2  F)     SpO2: 97%  98%       Last vitals prior to Anesthesia Care Transfer:  CRNA VITALS  3/1/2021 1013 - 3/1/2021 1054      3/1/2021             Pulse:  68    SpO2:  94 %          Electronically Signed By: LANDON Lozoya CRNA  March 1, 2021  10:54 AM

## 2022-03-24 ENCOUNTER — TRANSFERRED RECORDS (OUTPATIENT)
Dept: HEALTH INFORMATION MANAGEMENT | Facility: CLINIC | Age: 72
End: 2022-03-24
Payer: COMMERCIAL

## 2022-04-05 ENCOUNTER — TRANSFERRED RECORDS (OUTPATIENT)
Dept: HEALTH INFORMATION MANAGEMENT | Facility: CLINIC | Age: 72
End: 2022-04-05
Payer: COMMERCIAL

## 2022-04-11 ENCOUNTER — MEDICAL CORRESPONDENCE (OUTPATIENT)
Dept: HEALTH INFORMATION MANAGEMENT | Facility: CLINIC | Age: 72
End: 2022-04-11
Payer: COMMERCIAL

## 2022-04-20 ENCOUNTER — TRANSCRIBE ORDERS (OUTPATIENT)
Dept: OTHER | Age: 72
End: 2022-04-20

## 2022-04-20 DIAGNOSIS — M25.569 KNEE PAIN: Primary | ICD-10-CM

## 2022-04-26 NOTE — TELEPHONE ENCOUNTER
Action    Action Taken 5/2/2022 9:14AM KEB   I called [facility] [phone] #2- they will push the CT and injection image over to PACS    5/18/2022 3:43pm KEB   I called [phone] #[phone] I was transferred to medical records.. I was on hold for 7mins and left a  requesting a call back.     5/20/2022 2:16pm MING   I called [facility] again - I spoke to Maylin in medical Records- she will send over op notes.      DIAGNOSIS: Knee pain-loosening and susidence tibial component   APPOINTMENT DATE: 5/23/2022   NOTES STATUS DETAILS   OFFICE NOTE from referring provider Internal Isa Quesada PA-C   OFFICE NOTE from other specialist Received [facility] Records are in EPIC   MEDICATION LIST Internal    LABS     CBC/DIFF Care Everywhere    Radiology Injection  Complete- [facility]    CT SCAN Complete- [facility] CT Knee ([facility])    TUMOR     PATHOLOGY  Slides & report

## 2022-05-18 DIAGNOSIS — Z96.659 HISTORY OF TOTAL KNEE ARTHROPLASTY: Primary | ICD-10-CM

## 2022-05-23 ENCOUNTER — TELEPHONE (OUTPATIENT)
Dept: ORTHOPEDICS | Facility: CLINIC | Age: 72
End: 2022-05-23

## 2022-05-23 ENCOUNTER — OFFICE VISIT (OUTPATIENT)
Dept: ORTHOPEDICS | Facility: CLINIC | Age: 72
End: 2022-05-23
Payer: COMMERCIAL

## 2022-05-23 ENCOUNTER — ANCILLARY PROCEDURE (OUTPATIENT)
Dept: GENERAL RADIOLOGY | Facility: CLINIC | Age: 72
End: 2022-05-23
Attending: ORTHOPAEDIC SURGERY
Payer: COMMERCIAL

## 2022-05-23 ENCOUNTER — PRE VISIT (OUTPATIENT)
Dept: ORTHOPEDICS | Facility: CLINIC | Age: 72
End: 2022-05-23

## 2022-05-23 VITALS — BODY MASS INDEX: 45.64 KG/M2 | WEIGHT: 248 LBS | HEIGHT: 62 IN

## 2022-05-23 DIAGNOSIS — E66.01 MORBID OBESITY (H): Primary | ICD-10-CM

## 2022-05-23 DIAGNOSIS — M25.562 CHRONIC PAIN OF LEFT KNEE: ICD-10-CM

## 2022-05-23 DIAGNOSIS — T84.098A MECHANICAL COMPLICATION OF PROSTHETIC KNEE IMPLANT, INITIAL ENCOUNTER (H): ICD-10-CM

## 2022-05-23 DIAGNOSIS — Z96.659 HISTORY OF TOTAL KNEE ARTHROPLASTY: ICD-10-CM

## 2022-05-23 DIAGNOSIS — Z96.659 MECHANICAL COMPLICATION OF PROSTHETIC KNEE IMPLANT, INITIAL ENCOUNTER (H): ICD-10-CM

## 2022-05-23 DIAGNOSIS — M25.569 KNEE PAIN: ICD-10-CM

## 2022-05-23 DIAGNOSIS — G89.29 CHRONIC PAIN OF LEFT KNEE: ICD-10-CM

## 2022-05-23 LAB
APPEARANCE FLD: ABNORMAL
CELL COUNT BODY FLUID SOURCE: ABNORMAL
COLOR FLD: ABNORMAL
LYMPHOCYTES NFR FLD MANUAL: 32 %
MONOS+MACROS NFR FLD MANUAL: 67 %
NEUTS BAND NFR FLD MANUAL: 1 %
WBC # FLD AUTO: 1702 /UL

## 2022-05-23 PROCEDURE — 87075 CULTR BACTERIA EXCEPT BLOOD: CPT | Performed by: ORTHOPAEDIC SURGERY

## 2022-05-23 PROCEDURE — 99207 XR KNEE LT 1/2 VW: CPT | Mod: LT | Performed by: RADIOLOGY

## 2022-05-23 PROCEDURE — 99000 SPECIMEN HANDLING OFFICE-LAB: CPT | Performed by: PATHOLOGY

## 2022-05-23 PROCEDURE — 89051 BODY FLUID CELL COUNT: CPT | Performed by: ORTHOPAEDIC SURGERY

## 2022-05-23 PROCEDURE — 73562 X-RAY EXAM OF KNEE 3: CPT | Mod: LT | Performed by: RADIOLOGY

## 2022-05-23 PROCEDURE — 83518 IMMUNOASSAY DIPSTICK: CPT | Mod: 90 | Performed by: PATHOLOGY

## 2022-05-23 PROCEDURE — 99204 OFFICE O/P NEW MOD 45 MIN: CPT | Mod: 25 | Performed by: ORTHOPAEDIC SURGERY

## 2022-05-23 PROCEDURE — 87070 CULTURE OTHR SPECIMN AEROBIC: CPT | Performed by: ORTHOPAEDIC SURGERY

## 2022-05-23 PROCEDURE — 20610 DRAIN/INJ JOINT/BURSA W/O US: CPT | Mod: LT | Performed by: ORTHOPAEDIC SURGERY

## 2022-05-23 RX ORDER — HYDROCODONE BITARTRATE AND ACETAMINOPHEN 5; 325 MG/1; MG/1
TABLET ORAL EVERY 4 HOURS PRN
Status: ON HOLD | COMMUNITY
Start: 2022-05-19 | End: 2022-07-12

## 2022-05-23 RX ORDER — FLASH GLUCOSE SCANNING READER
EACH MISCELLANEOUS
COMMUNITY
Start: 2021-07-07

## 2022-05-23 RX ORDER — OXYBUTYNIN CHLORIDE 5 MG/1
TABLET, EXTENDED RELEASE ORAL
Status: ON HOLD | COMMUNITY
Start: 2022-02-21 | End: 2022-07-12

## 2022-05-23 RX ORDER — CEPHALEXIN 500 MG/1
CAPSULE ORAL
Status: ON HOLD | COMMUNITY
Start: 2022-04-05 | End: 2022-07-12

## 2022-05-23 RX ORDER — CALCIUM CARBONATE/VITAMIN D3 600 MG-10
2400 TABLET ORAL
Status: ON HOLD | COMMUNITY
End: 2022-07-12

## 2022-05-23 RX ORDER — METFORMIN HCL 500 MG
500 TABLET, EXTENDED RELEASE 24 HR ORAL
COMMUNITY
Start: 2022-02-21

## 2022-05-23 RX ORDER — FLASH GLUCOSE SENSOR
KIT MISCELLANEOUS
COMMUNITY
Start: 2022-04-12

## 2022-05-23 ASSESSMENT — KOOS JR: HOW SEVERE IS YOUR KNEE STIFFNESS AFTER FIRST WAKING IN MORNING: MODERATE

## 2022-05-23 ASSESSMENT — ACTIVITIES OF DAILY LIVING (ADL): FUNCTION,_DAILY_LIVING_SCORE: 42.65

## 2022-05-23 NOTE — CONFIDENTIAL NOTE
-Pre-Op teaching was performed over the phone.    Teaching Flowsheet   Relevant Diagnosis: Pre-Op Teaching   Teaching Topic: Left knee arthroplasty       Person(s) involved in teaching:   Patient     Motivation Level:  Asks Questions: Yes  Eager to Learn: Yes  Cooperative: Yes  Receptive (willing/able to accept information): Yes  Any cultural factors/Jew beliefs that may influence understanding or compliance? No  Comments:      Patient demonstrates understanding of the following:  Reason for the appointment, diagnosis and treatment plan: Yes  Knowledge of proper use of medications and conditions for which they are ordered (with special attention to potential side effects or drug interactions): Yes  Which situations necessitate calling provider and whom to contact: Yes     Teaching Concerns Addressed:   Comments:      Proper use and care of surgical scrub. (medical equip, care aids, etc.): Yes  Nutritional needs and diet plan: Yes  Pain management techniques: Yes  Wound Care: Yes  How and/when to access community resources: Yes     Instructional Materials Used/Given: Surgical scrub, surgery packet & joint booklet provided at today's visit.      -In addition to the information above, we also discussed important phone numbers, map/ location of surgery, stop light tool, GetWell Loop, medications to avoid prior to surgery, showering instructions, dental visits, PAC appointment, pre-op H & P with primary to discuss diabetes, etc.    -We will reach out to the patient in 2 weeks time, when the results of today's aspiration are complete.  Then, we will call her to schedule a surgery date, a PAC appointment, pre-op with primary MD (to discuss diabetes), and a Covid test.    -Patient' notes that one of her sons will be driving her home from the hospital & staying with her for 3-4 days afterwards (sons: Anthony & Angel).    -Patient is going to reach out to her dentist to schedule an appointment soon.    -Patient voiced an  understanding and will proceed as directed.    Olga Roth RN  5/23/2022 3:30 PM        Time spent with patient: 15 minutes.

## 2022-05-23 NOTE — NURSING NOTE
"Chief Complaint   Patient presents with     Consult     L>R Knee pain- pain onset summer 2021, saw Dr. Dawson in October 21' has tried PT and states it got worse. Patient states that last week she had very sharp pain and was unable to weight bear // loosening and susidence tibial component/ext CT/Isa Quesada PA-C/bcbs/orthocon       71 year old  1950    Ht 1.549 m (5' 1\")   Wt 114.3 kg (252 lb)   BMI 47.61 kg/m        Date/Surgery/Surgeon/Hospital:  Past Surgical History:   Procedure Laterality Date     CHOLECYSTECTOMY       ORTHOPEDIC SURGERY       PHACOEMULSIFICATION WITH STANDARD INTRAOCULAR LENS IMPLANT Left 2/10/2021    Procedure: Cataract removal with implant.;  Surgeon: Loyd Marquez MD;  Location: [facility]     PHACOEMULSIFICATION WITH STANDARD INTRAOCULAR LENS IMPLANT Right 3/1/2021    Procedure: Cataract removal with implant.;  Surgeon: Loyd Marquez MD;  Location: [facility] OR                   Pain Assessment  Patient Currently in Pain: Yes  0-10 Pain Scale: 6 (No pain at rest and NWB, while walking she has constant pain and occasional stabbing pain in the posterior aspect of the knee)              [facility] - [place], [place] - [address] [place] [address]    Allergies   Allergen Reactions     Codeine Other (See Comments)     Pasted out  Pasted out             Current Outpatient Medications   Medication     aspirin 81 MG EC tablet     atorvastatin (LIPITOR) 80 MG tablet     Continuous Blood Gluc  (FREESTYLE RADHA 14 DAY READER) ANABEL     Continuous Blood Gluc Sensor (FREESTYLE RADHA 14 DAY SENSOR) MISC     empagliflozin (JARDIANCE) 10 MG TABS tablet     ezetimibe (ZETIA) 10 MG tablet     HYDROcodone-acetaminophen (NORCO) 5-325 MG tablet     metFORMIN (GLUCOPHAGE XR) 500 MG 24 hr tablet     metoprolol tartrate (LOPRESSOR) 50 MG tablet     Multiple Vitamin (ONCE DAILY PO)     Omega-3 Fatty Acids (FISH OIL) 1200 MG capsule     oxybutynin ER (DITROPAN XL) 5 MG 24 hr tablet     " telmisartan-HCTZ (MICARDIS HCT) 80-12.5 MG tablet     cephALEXin (KEFLEX) 500 MG capsule     ibuprofen (ADVIL/MOTRIN) 200 MG tablet     omega 3 1200 MG CAPS     No current facility-administered medications for this visit.             Questionnaires:    HOOS Hip Dysfunction & Osteoarthritis Outcome Questionnaire    No flowsheet data found.           KOOS Knee Survey Assessment    No flowsheet data found.           Promis 10 Assessment    No flowsheet data found.           Ortho Oxford Knee Questionnaire    No flowsheet data found.

## 2022-05-23 NOTE — LETTER
5/23/2022         RE: Jluis Duke  [address]  [place] [address]        Dear Colleague,    Thank you for referring your patient, Jluis Duke, to the [facility] [place]. Please see a copy of my visit note below.        [facility]  Orthopaedic Surgery, Joint Replacement Consultation  by Minesh Mayo M.D.    Jluis Duke MRN# [id number]    YOB: 1950     Requesting physician: Olivia Boss MD [facility]            Assessment and Plan:   Assessment:  1. 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  2. Type 2 diabetes mellitus  3. Elevated BMI Body mass index is 45.64 kg/m .      Plan:  1. Patient will be scheduled undergo revision of her left knee arthroplasty, likely in 4-8 weeks time depending upon issues below.  Proposed procedure: Revision left total knee arthroplasty, tier 3, 2 hours, inpatient 2 nights.  I emphasized the patient that she is at higher surgical risk status perioperatively for wound complication due to her morbid obesity and diabetes despite its control.  2. Preoperatively we will perform aspiration of the left knee.  We will delay surgical date until results of the aspiration are available in 2 weeks time and she completes her dental work as well.  3. Preop anesthesia evaluation  4. Primary care preop evaluation.  Recent 2/21/2022 hemoglobin A1c 5.8 g  5. Patient needs to see her dentist prior to revision knee arthroplasty  6. Obtain prior implant data from health [email or web address].  found on [facility], see below.      Procedure note:  Under sterile precautions, the left knee was aspirated through a superior lateral approach.  9 cc of pink-colored fluid was aspirated without complication.  This was sent for cell count, differential, aerobic and anaerobic cultures as well as alpha defensin synovasure testing      HISTORY   71 year old  female  chief complaint    Patient has had successful knee arthroplasties performed in 2008 by Dr. Sher Weinstein however she began having discomfort proximally 1 year ago involving the left knee joint and is progressively gotten worse to the point where she has been nearly unable to walk over the past several weeks.  She was independently ambulatory until 1 year ago when she began to use a walker.  At the present time she has a severe limp and barely is able to ambulate with a walker.  Denies fever, no problems with prior drainage of the wound.    Saw Dr. Dawson who referred pt after his evaluations.    Current symptoms:  Problem: Bilateral Knee Pain   Onset and duration: Summer 2021   Awakens from sleep due to sx's:  Yes  Precipitating Injury:  No    Other joints or sites painful:  Yes      Background history:  DX:  1. Bilateral DJD knees  2. DM type 2  3. Obesity, Body mass index is 45.64 kg/m .    TREATMENTS:  1. [facility]: NexGen LPS flex size E left gender-specific femoral component.NexGen MIS tibial component size 3. 12 mm LPX flex polyethylene. 29 mm 3-peg, all-polyethylene patella.   2. 1/28/2008, Right TKA (Reji Weinstein) [facility]  (IMPLANTS: NexGen LPS flex size E right femoral component.MIS NexGen tibial component size 4 precoat. NexGen LPS flex articular surface 10 mm in heights. All polyethylene NexGen patella size 29.            Physical Exam:     EXAMINATION pertinent findings:   PSYCH: Pleasant, healthy-appearing, alert, oriented x3, cooperative. Normal mood and affect.  VITAL SIGNS: There were no vitals taken for this visit..  Reviewed nursing intake notes.   Body mass index is 45.64 kg/m .  RESP: non labored breathing   ABD: benign, soft, non-tender, no acute peritoneal findings.  Obese.  SKIN: grossly normal   LYMPHATIC: grossly normal, no adenopathy, no extremity edema  NEURO: grossly normal , no motor deficits  VASCULAR: satisfactory  perfusion of all extremities   MUSCULOSKELETAL:     Severely antalgic gait on the left side.  Able to fully weight-bear on the right side.  Left knee can fully extend is further flexion to 100 degrees.  Right knee has 0-100 beats flexion.  Collateral ligaments stable bilaterally.    Full range of motion bilateral both hip joints.           Data:   All laboratory data reviewed  All imaging studies reviewed by me    Radiolucencies noted beneath the tibial tray in the left side.    DATA for DOCUMENTATION:         Past Medical History:     Patient Active Problem List   Diagnosis     Essential hypertension     Past Medical History:   Diagnosis Date     Diabetes (H)      Hypertension      Sleep apnea        Also see scanned health assessment forms.       Past Surgical History:     Past Surgical History:   Procedure Laterality Date     CHOLECYSTECTOMY       ORTHOPEDIC SURGERY       PHACOEMULSIFICATION WITH STANDARD INTRAOCULAR LENS IMPLANT Left 2/10/2021    Procedure: Cataract removal with implant.;  Surgeon: Loyd Marquez MD;  Location: [facility]     PHACOEMULSIFICATION WITH STANDARD INTRAOCULAR LENS IMPLANT Right 3/1/2021    Procedure: Cataract removal with implant.;  Surgeon: Loyd Marquez MD;  Location: [facility] OR            Social History:     Social History     Socioeconomic History     Marital status: Single     Spouse name: Not on file     Number of children: Not on file     Years of education: Not on file     Highest education level: Not on file   Occupational History     Not on file   Tobacco Use     Smoking status: Never Smoker     Smokeless tobacco: Never Used   Substance and Sexual Activity     Alcohol use: Not Currently     Drug use: Never     Sexual activity: Not Currently   Other Topics Concern     Parent/sibling w/ CABG, MI or angioplasty before 65F 55M? Not Asked   Social History Narrative     Not on file     Social Determinants of Health     Financial Resource Strain: Not on file   Food  Insecurity: Not on file   Transportation Needs: Not on file   Physical Activity: Not on file   Stress: Not on file   Social Connections: Not on file   Intimate Partner Violence: Not on file   Housing Stability: Not on file            Family History:     No family history on file.         Medications:     Current Outpatient Medications   Medication Sig     aspirin 81 MG EC tablet Take 81 mg by mouth     atorvastatin (LIPITOR) 80 MG tablet Take 80 mg by mouth     empagliflozin (JARDIANCE) 10 MG TABS tablet Take 10 mg by mouth daily     ezetimibe (ZETIA) 10 MG tablet Take 10 mg by mouth daily     ibuprofen (ADVIL/MOTRIN) 200 MG tablet Take 400-600 mg by mouth     metoprolol tartrate (LOPRESSOR) 50 MG tablet TAKE 1 & 1/2 (ONE & ONE-HALF) TABLETS BY MOUTH TWICE DAILY     Multiple Vitamin (ONCE DAILY PO) Take 1 tablet by mouth     Omega-3 Fatty Acids (FISH OIL) 1200 MG capsule Take 2,400 mg by mouth once     telmisartan-HCTZ (MICARDIS HCT) 80-12.5 MG tablet Take 2 tablets by mouth     No current facility-administered medications for this visit.              Review of Systems:   A comprehensive 10 point review of systems (constitutional, ENT, cardiac, peripheral vascular, lymphatic, respiratory, GI, , Musculoskeletal, skin, Neurological) was performed and found to be negative except as described in this note.       HOOS Hip Dysfunction & Osteoarthritis Outcome Questionnaire    No flowsheet data found.       [unfilled]    KOOS Knee Survey Assessment    No flowsheet data found.       Promis 10 Assessment    No flowsheet data found.       Ortho Oxford Knee Questionnaire    No flowsheet data found.             See intake form completed by patient

## 2022-05-23 NOTE — PROGRESS NOTES
[facility]  Orthopaedic Surgery, Joint Replacement Consultation  by Minesh Mayo M.D.    Jluis Duke MRN# [id number]    YOB: 1950     Requesting physician: Isa Aviles, Olivia Dawson MD [facility]            Assessment and Plan:   Assessment:  1. 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  2. Type 2 diabetes mellitus  3. Elevated BMI Body mass index is 45.64 kg/m .      Plan:  1. Patient will be scheduled undergo revision of her left knee arthroplasty, likely in 4-8 weeks time depending upon issues below.  Proposed procedure: Revision left total knee arthroplasty, tier 3, 2 hours, inpatient 2 nights.  I emphasized the patient that she is at higher surgical risk status perioperatively for wound complication due to her morbid obesity and diabetes despite its control.  2. Preoperatively we will perform aspiration of the left knee.  We will delay surgical date until results of the aspiration are available in 2 weeks time and she completes her dental work as well.  3. Preop anesthesia evaluation  4. Primary care preop evaluation.  Recent 2/21/2022 hemoglobin A1c 5.8 g  5. Patient needs to see her dentist prior to revision knee arthroplasty  6. Obtain prior implant data from health [employer].  found on [facility], see below.      Procedure note:  Under sterile precautions, the left knee was aspirated through a superior lateral approach.  9 cc of pink-colored fluid was aspirated without complication.  This was sent for cell count, differential, aerobic and anaerobic cultures as well as alpha defensin synovasure testing      HISTORY   71 year old female  chief complaint    Patient has had successful knee arthroplasties performed in 2008 by Dr. Sher Weinstein however she began having discomfort proximally 1 year ago involving the left knee joint and is progressively gotten worse to the point where she has been nearly  unable to walk over the past several weeks.  She was independently ambulatory until 1 year ago when she began to use a walker.  At the present time she has a severe limp and barely is able to ambulate with a walker.  Denies fever, no problems with prior drainage of the wound.    Saw Dr. Dawson who referred pt after his evaluations.    Current symptoms:  Problem: Bilateral Knee Pain   Onset and duration: Summer 2021   Awakens from sleep due to sx's:  Yes  Precipitating Injury:  No    Other joints or sites painful:  Yes      Background history:  DX:  1. Bilateral DJD knees  2. DM type 2  3. Obesity, Body mass index is 45.64 kg/m .    TREATMENTS:  1. 6/2/2008, Left TKA ([facility] (IMPLANTS: NexGen LPS flex size E left gender-specific femoral component.NexGen MIS tibial component size 3. 12 mm LPX flex polyethylene. 29 mm 3-peg, all-polyethylene patella.   2. 1/28/2008, Right TKA ([facility]  (IMPLANTS: NexGen LPS flex size E right femoral component.MIS NexGen tibial component size 4 precoat. NexGen LPS flex articular surface 10 mm in heights. All polyethylene NexGen patella size 29.            Physical Exam:     EXAMINATION pertinent findings:   PSYCH: Pleasant, healthy-appearing, alert, oriented x3, cooperative. Normal mood and affect.  VITAL SIGNS: There were no vitals taken for this visit..  Reviewed nursing intake notes.   Body mass index is 45.64 kg/m .  RESP: non labored breathing   ABD: benign, soft, non-tender, no acute peritoneal findings.  Obese.  SKIN: grossly normal   LYMPHATIC: grossly normal, no adenopathy, no extremity edema  NEURO: grossly normal , no motor deficits  VASCULAR: satisfactory perfusion of all extremities   MUSCULOSKELETAL:     Severely antalgic gait on the left side.  Able to fully weight-bear on the right side.  Left knee can fully extend is further flexion to 100 degrees.  Right knee has 0-100 beats flexion.  Collateral ligaments stable  bilaterally.    Full range of motion bilateral both hip joints.           Data:   All laboratory data reviewed  All imaging studies reviewed by me    Radiolucencies noted beneath the tibial tray in the left side.    DATA for DOCUMENTATION:         Past Medical History:     Patient Active Problem List   Diagnosis     Essential hypertension     Past Medical History:   Diagnosis Date     Diabetes (H)      Hypertension      Sleep apnea        Also see scanned health assessment forms.       Past Surgical History:     Past Surgical History:   Procedure Laterality Date     CHOLECYSTECTOMY       ORTHOPEDIC SURGERY       PHACOEMULSIFICATION WITH STANDARD INTRAOCULAR LENS IMPLANT Left 2/10/2021    Procedure: Cataract removal with implant.;  Surgeon: Loyd Marquez MD;  Location: [facility]     PHACOEMULSIFICATION WITH STANDARD INTRAOCULAR LENS IMPLANT Right 3/1/2021    Procedure: Cataract removal with implant.;  Surgeon: Loyd Marquez MD;  Location: [facility] OR            Social History:     Social History     Socioeconomic History     Marital status: Single     Spouse name: Not on file     Number of children: Not on file     Years of education: Not on file     Highest education level: Not on file   Occupational History     Not on file   Tobacco Use     Smoking status: Never Smoker     Smokeless tobacco: Never Used   Substance and Sexual Activity     Alcohol use: Not Currently     Drug use: Never     Sexual activity: Not Currently   Other Topics Concern     Parent/sibling w/ CABG, MI or angioplasty before 65F 55M? Not Asked   Social History Narrative     Not on file     Social Determinants of Health     Financial Resource Strain: Not on file   Food Insecurity: Not on file   Transportation Needs: Not on file   Physical Activity: Not on file   Stress: Not on file   Social Connections: Not on file   Intimate Partner Violence: Not on file   Housing Stability: Not on file            Family History:     No family history  on file.         Medications:     Current Outpatient Medications   Medication Sig     aspirin 81 MG EC tablet Take 81 mg by mouth     atorvastatin (LIPITOR) 80 MG tablet Take 80 mg by mouth     empagliflozin (JARDIANCE) 10 MG TABS tablet Take 10 mg by mouth daily     ezetimibe (ZETIA) 10 MG tablet Take 10 mg by mouth daily     ibuprofen (ADVIL/MOTRIN) 200 MG tablet Take 400-600 mg by mouth     metoprolol tartrate (LOPRESSOR) 50 MG tablet TAKE 1 & 1/2 (ONE & ONE-HALF) TABLETS BY MOUTH TWICE DAILY     Multiple Vitamin (ONCE DAILY PO) Take 1 tablet by mouth     Omega-3 Fatty Acids (FISH OIL) 1200 MG capsule Take 2,400 mg by mouth once     telmisartan-HCTZ (MICARDIS HCT) 80-12.5 MG tablet Take 2 tablets by mouth     No current facility-administered medications for this visit.              Review of Systems:   A comprehensive 10 point review of systems (constitutional, ENT, cardiac, peripheral vascular, lymphatic, respiratory, GI, , Musculoskeletal, skin, Neurological) was performed and found to be negative except as described in this note.       HOOS Hip Dysfunction & Osteoarthritis Outcome Questionnaire    No flowsheet data found.           [unfilled]    KOOS Knee Survey Assessment    No flowsheet data found.           Promis 10 Assessment    No flowsheet data found.           Ortho Oxford Knee Questionnaire    No flowsheet data found.             See intake form completed by patient

## 2022-05-24 ENCOUNTER — PREP FOR PROCEDURE (OUTPATIENT)
Dept: ORTHOPEDICS | Facility: CLINIC | Age: 72
End: 2022-05-24
Payer: COMMERCIAL

## 2022-05-24 DIAGNOSIS — G89.29 CHRONIC PAIN OF LEFT KNEE: Primary | ICD-10-CM

## 2022-05-24 DIAGNOSIS — M25.562 CHRONIC PAIN OF LEFT KNEE: Primary | ICD-10-CM

## 2022-05-24 LAB
ALPHA DEFENSINS 1+2+3 SNV QL IA: NEGATIVE
PRELIM TEST AFFECT FURTHER TEST SPEC: YES
SPECIMEN SOURCE SNV: NORMAL

## 2022-05-28 LAB — BACTERIA SNV CULT: NO GROWTH

## 2022-06-06 LAB — BACTERIA SNV CULT: NORMAL

## 2022-06-07 ENCOUNTER — TELEPHONE (OUTPATIENT)
Dept: ORTHOPEDICS | Facility: CLINIC | Age: 72
End: 2022-06-07
Payer: COMMERCIAL

## 2022-06-07 NOTE — CONFIDENTIAL NOTE
"-Patient underwent a left knee aspiration  In the clinic on 5/23/22.    -The lab results are in & Rosalind Mario reviewed them & recommended the following:    \"The results of the labs show no signs of infection. We will proceed forward as planned with TKA revision surgery for aseptic loosening of tibial implant.\"    -Patient has been notified.  -Pre-Op teaching was already performed on 5/23/22.    -Next step: Doris to call patient & schedule the following:    -Surgery date  -PAC appointment  -Covid test  -Pre-Op with primary MD    -Patient voiced an understanding and an agreement of the plan.     Olga Roth RN  6/7/2022 12:56 PM     "

## 2022-06-10 ENCOUNTER — TELEPHONE (OUTPATIENT)
Dept: ORTHOPEDICS | Facility: CLINIC | Age: 72
End: 2022-06-10
Payer: COMMERCIAL

## 2022-06-10 NOTE — TELEPHONE ENCOUNTER
Patient is scheduled for surgery with Dr. Mayo    Spoke with: patient    Date of Surgery: 07/12/2022    Location: [facility]    Informed patient they will need an adult  Yes    Post Op: 4 week, scheduled    Pre op with Provider complete    H&P: Scheduled with PAC on 6/20/22, PCP on 6/30    Pre-procedure COVID-19 Test: 07/8/2022 jono.     Additional imaging/appointments: dental scheduled in June    Surgery packet: received in clinic     Additional comments: N/A

## 2022-06-10 NOTE — TELEPHONE ENCOUNTER
FUTURE VISIT INFORMATION      SURGERY INFORMATION:    Date: 22    Location: [facility]    Surgeon:  Minesh Mayo MD    Anesthesia Type:  general    Procedure: Revision Left total knee arthroplasty    Consult: ov     RECORDS REQUESTED FROM:       Primary Care Provider: Olivia Aviles MD- [facility]    Pertinent Medical History: hypertension    Most recent EKG+ Tracin20- [facility]    Most recent ECHO: 3/16/21- [facility]    Most recent PFT's: 8/10/21- [facility]

## 2022-06-10 NOTE — TELEPHONE ENCOUNTER
Phoned patient to schedule surgery with Dr Mayo. I left her my direct number to call back at her convenience.   [phone]

## 2022-06-20 ENCOUNTER — PRE VISIT (OUTPATIENT)
Dept: SURGERY | Facility: CLINIC | Age: 72
End: 2022-06-20
Payer: COMMERCIAL

## 2022-06-27 ENCOUNTER — TELEPHONE (OUTPATIENT)
Dept: OTHER | Age: 72
End: 2022-06-27

## 2022-06-27 NOTE — TELEPHONE ENCOUNTER
Hello,  I'm with ortho con.  Pt of Dr. Mayo is calling asking for pain meds for knee prior to surgery 7/12 and cannot sleep at night.  Dr. Nava had prescribed Hydrocod-acetam which worked well.  She was given 12 in May and would like 12 more if possible or whatever you prescribe.  Pt uses [facility] pharmacy in [place] on [address].   Can you help?

## 2022-06-27 NOTE — TELEPHONE ENCOUNTER
ATC spoke with STAR Mario from Dr. Mayo's team. They don't premedicate patients prior to surgery. ATC called the pt and relayed info to her. She verified understanding. She will check with Dr. Nava.          -SASKIA Watson- Orthopedics

## 2022-06-29 NOTE — PROGRESS NOTES
SURGERY PLAN (PRE-OP PLAN)    Assessment:  1. 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  2. Type 2 diabetes mellitus  3. Elevated BMI Body mass index is 45.64 kg/m .        Plan:  1. Patient will be scheduled undergo revision of her left knee arthroplasty, likely in 4-8 weeks time depending upon issues below.  Proposed procedure: Revision left total knee arthroplasty, tier 3, 2 hours, inpatient 2 nights.  I emphasized the patient that she is at higher surgical risk status perioperatively for wound complication due to her morbid obesity and diabetes despite its control.  2. Preoperatively we will perform aspiration of the left knee.  We will delay surgical date until results of the aspiration are available in 2 weeks time and she completes her dental work as well.  3. Preop anesthesia evaluation  4. Primary care preop evaluation.  Recent 2/21/2022 hemoglobin A1c 5.8 g  5. Patient needs to see her dentist prior to revision knee arthroplasty  6. Obtain prior implant data from [facility].  found on [employer]wilt, see below.        Background history:  DX:  4. Bilateral DJD knees  5. DM type 2  6. Obesity, Body mass index is 45.64 kg/m .     TREATMENTS:  7. [facility] ([facility]PL[facility]S: NexGen LPS flex size E left gender-specific femoral component.NexGen MIS tibial component size 3. 12 mm LPX flex polyethylene. 29 mm 3-peg, all-polyethylene patella.   8. 1/28/2008[facility]  (IMPLANTS: NexGen LPS flex size E right femoral component.MIS NexGen tibial component size 4 precoat. NexGen LPS flex articular surface 10 mm in heights. All polyethylene NexGen patella size 29.         Patient Position (indicated by x):  x  Supine     Supine with torso rolled up on a bump     Floppy lateral on torso length bean bag     Lateral decubitus, bean bag, full length     Lateral decubitus, Wixson hip positioner     Safety  "paddle side supports x 2 clamped to side rail     Lithotomy, both legs in yellow padded leg mack     Lithotomy, single leg in yellow padded leg mack     Prone on blanket rolls/round gel pad     Prone on Bayron (arched) frame on Henry table     Single thigh in orange arthroscopy clamp     Beach chair semi recumbent     Spider limb positioner     Arm out on radiolucent arm table     Split drape with top bar     Revision THAI drape with plastic side bags for leg   x  Extremity drape     Shoulder pack drape     Laparotomy drape     Beckman catheter          General Equipment Requests (indicated by x):      C-Arm with C-Armor drape     C-Arm (video capable, [employer] 9900 model)     O-Arm with Stealth imaging     Fracture Table     Henry XR Table     SurgiGraphic 6000 (diving board) fluoro table     Cell saver     [facility] Biopsy trephine set w/ K-wire & pituitary rongeurs     Small pituitary rongeur     Gael's angled curettes, narrow shaft     Bone graft, kapner gouges     Midas Anton Medtronic victorino, electric motor     Phenol 5%     [place] BMAC stem cell     Vancomycin 1 gram powder     Zometa 4 mg vials     Depo Medrol steroid     Blunt Pelvic Retractor (.55, Blunt Hohmann with  slight bend)     (1) Portable hand held radiation detector machine for sentinel node biopsy and (2) Lymphazurin   x  [place] Osteotomes       Implant Extraction/Cement spacer tools (indicated by x):     Biomet Ultrasound cement removal     Midas Anton, AC-1 (1mm tiny dissecting tip with wire guide gold handpiece)   x  Flexible osteotomes (both black and wood handled with new replacement blades)   x  Single and double edge reciprocating saw     \"L\" hook & hoop style stem extractor stem extractor (DePuy AML stem extractor)     Stephanie Explant cup removal osteotomes     Suction tip with debris trap (clear plastic disposable)   x  Biomet offset implant impactor (white handle in Gael's cart)     Noris IM canal long shaft cement removal shashank, " pituitary ronguers)     Biomet Spacer One hip stem spacer molds with trials     Biomet Articulating knee spacer molds with trials     Biomet [place] Blue gentamycin PMMA and Vanco 1 g vial/package PMMA     Andrade Rods, large + zoë cutter     TKA Requests (indicated by x):   x  Stephanie Nexgen TKA, + revision    x  Biomet persona revision TKA + stems     [place] Trathlon revision knee + stems     Reciprocating saw     Universal tibial baseplate extractor     Biomet OSS rotating hinge knee     Biomet OSS distal femur replacement     Biomet OSS prox tib replacement     Biomet IM knee fusion nail     Specimens and cultures (indicated by x):   x  Tissue cultures, aerobic and anaerobic without gram stain     Frozen section     pathology specimens - fresh     pathology specimens - formalin       Amarjit Morales DO  Adult Joint Reconstruction Fellow  Dept Orthopaedic Surgery, [facility]

## 2022-07-08 ENCOUNTER — PRE VISIT (OUTPATIENT)
Dept: SURGERY | Facility: CLINIC | Age: 72
End: 2022-07-08
Payer: COMMERCIAL

## 2022-07-08 ENCOUNTER — LAB (OUTPATIENT)
Dept: LAB | Facility: CLINIC | Age: 72
End: 2022-07-08
Payer: COMMERCIAL

## 2022-07-08 DIAGNOSIS — Z20.822 ENCOUNTER FOR LABORATORY TESTING FOR COVID-19 VIRUS: ICD-10-CM

## 2022-07-08 PROCEDURE — U0003 INFECTIOUS AGENT DETECTION BY NUCLEIC ACID (DNA OR RNA); SEVERE ACUTE RESPIRATORY SYNDROME CORONAVIRUS 2 (SARS-COV-2) (CORONAVIRUS DISEASE [COVID-19]), AMPLIFIED PROBE TECHNIQUE, MAKING USE OF HIGH THROUGHPUT TECHNOLOGIES AS DESCRIBED BY CMS-2020-01-R: HCPCS

## 2022-07-08 PROCEDURE — U0005 INFEC AGEN DETEC AMPLI PROBE: HCPCS

## 2022-07-09 LAB — SARS-COV-2 RNA RESP QL NAA+PROBE: NEGATIVE

## 2022-07-11 ENCOUNTER — VIRTUAL VISIT (OUTPATIENT)
Dept: SURGERY | Facility: CLINIC | Age: 72
End: 2022-07-11
Payer: COMMERCIAL

## 2022-07-11 ENCOUNTER — ANESTHESIA EVENT (OUTPATIENT)
Dept: SURGERY | Facility: CLINIC | Age: 72
DRG: 467 | End: 2022-07-11
Payer: COMMERCIAL

## 2022-07-11 ENCOUNTER — PRE VISIT (OUTPATIENT)
Dept: SURGERY | Facility: CLINIC | Age: 72
End: 2022-07-11

## 2022-07-11 DIAGNOSIS — Z01.818 PRE-OP EVALUATION: Primary | ICD-10-CM

## 2022-07-11 PROCEDURE — 99204 OFFICE O/P NEW MOD 45 MIN: CPT | Mod: 95 | Performed by: PHYSICIAN ASSISTANT

## 2022-07-11 ASSESSMENT — LIFESTYLE VARIABLES: TOBACCO_USE: 0

## 2022-07-11 ASSESSMENT — PAIN SCALES - GENERAL: PAINLEVEL: NO PAIN (0)

## 2022-07-11 NOTE — PATIENT INSTRUCTIONS
Preparing for Your Surgery      Name:  Jluis Duke   MRN:  [id number]   :  1950   Today's Date:  2022       Arriving for surgery:  Surgery date:  22  Arrival time:  06:30 am    Restrictions due to COVID 19       Effective 22 [facility] is implementing the following visitor policy:     1 person may accompany the patient through the Pre-Op process.      That same person may wait in the Surgery Waiting room, provided there is enough room to social distance           Visitors must wear a mask.      Visitors must not be ill.        Inpatients are allowed 2 visitors per day for the duration of their stay.      Visiting hours are 8 am to 8 pm.    [employer] parking is available for anyone with mobility limitations or disabilities.  ([facility]  24 hours/ 7 days a week; [facility]  7 am- 3:30 pm, Mon- Fri)    Please come to:     [facility] [address]  [address] [place], [place] [address]. [place]  [address]    -[employer] parking is available in front of [facility] from 5:15AM to 8:00PM. If you prefer, park your car in the Green Lot.    -Proceed to the 3rd floor, check in at the Adult Surgery Waiting Lounge. [phone]    If an escort is needed stop at the Information Desk in the lobby. Inform the information person that you are here for surgery. An escort to the Adult Surgery Waiting Lounge will be provided.    What can I eat or drink?  -  You may eat and drink normally for up to 8 hours before your surgery.   -  You may have clear liquids until 2 hours before surgery.    Examples of clear liquids:  Water  Clear broth  Juices (apple, white grape, white cranberry  and cider) without pulp  Noncarbonated, powder based beverages  (lemonade and Carlos-Aid)  Sodas (Sprite, 7-Up, ginger ale and seltzer)  Coffee or tea (without milk or cream)  Gatorade    -  No Alcohol for at least 24 hours before surgery     Which medicines can I take?    Hold Aspirin  for 7 days before surgery.   Hold Multivitamins for 7 days before surgery.  Hold Supplements (fish oil) for 7 days before surgery.  Hold Ibuprofen (Advil, Motrin) for 1 day before surgery--unless otherwise directed by surgeon.  Hold Naproxen (Aleve) for 4 days before surgery.    Hold all NSAIDS for 7 days before spine surgery. (Ibuprofen, Naproxen, Celebrex, Indocin, Diclofenac)    -  DO NOT take these medications the day of surgery:  Micardis + jardiance.    -  PLEASE TAKE these medications the day of surgery:  Tylenol or norco if needed; take other morning medications.    How do I prepare myself?  - Please take 2 showers before surgery using Scrubcare or Hibiclens soap.    Use this soap only from the neck to your toes.     Leave the soap on your skin for one minute--then rinse thoroughly.      You may use your own shampoo and conditioner; no other hair products.   - Please remove all jewelry and body piercings.  - No lotions, deodorants or fragrance.  - No makeup or fingernail polish.   - Bring your ID and insurance card.    -If you have a Deep Brain Stimulator, Spinal Cord Stimulator or any neuro stimulator device---you must bring the remote control to the hospital           Questions or Concerns:    - For any questions regarding the day of surgery or your hospital stay, please contact the Pre Admission Nursing Office at [phone].       - If you have health changes between today and your surgery please call your surgeon.       For questions after surgery please call your surgeons office.

## 2022-07-11 NOTE — PROGRESS NOTES
Jluis is a 71 year old who is being evaluated via a billable video visit.      How would you like to obtain your AVS? [email or web address]  If the video visit is dropped, the invitation should be resent by: Text to cell phone: [phone]     HPI       Review of Systems         Objective    Vitals - Patient Reported  Pain Score: No Pain (0) (Left knee pain when walking)        Physical Exam     .  ..

## 2022-07-11 NOTE — H&P (VIEW-ONLY)
Pre-Operative H & P     CC:  Preoperative exam to assess for increased cardiopulmonary risk while undergoing surgery and anesthesia.    Date of Encounter: 7/11/2022  Primary Care Physician:  Olivia Aviles     Reason for visit:   Encounter Diagnosis   Name Primary?     Pre-op evaluation Yes       HPI  Jluis Duke is a 71 year old female who presents for pre-operative H & P in preparation for  Procedure Information     Case: [id number] Date/Time: 07/12/22 0910    Procedure: Revision Left total knee arthroplasty (Left Knee)    Anesthesia type: General    Diagnosis: Chronic pain of left knee [M25.562, G89.29]    Pre-op diagnosis: Chronic pain of left knee [M25.562, G89.29]    Location:  OR 14 / [facility]    Providers: Minesh Mayo MD          Patient is being evaluated for comorbid conditions of Hypertension, MELVINA, diabetes, obesity, RLS    Ms. Duke has a history of bilateral TKA in 2008. She was doing well until about 1 year ago when she developed left knee discomfort. This pain has been progressively worsening since that time. She was seen by Dr. Mayo for further evaluation and is now scheduled for the above procedure.     History is obtained from the patient and chart review    Hx of abnormal bleeding or anti-platelet use: ASA    menstrual history: No LMP recorded. Patient is postmenopausal.     Past Medical History  Past Medical History:   Diagnosis Date     Diabetes (H)      Hypertension      Sleep apnea        Past Surgical History  Past Surgical History:   Procedure Laterality Date     CHOLECYSTECTOMY       COLONOSCOPY       ORTHOPEDIC SURGERY Bilateral     2008 bilateral knees     PHACOEMULSIFICATION WITH STANDARD INTRAOCULAR LENS IMPLANT Left 02/10/2021    Procedure: Cataract removal with implant.;  Surgeon: Loyd Marquez MD;  Location: [facility]     PHACOEMULSIFICATION WITH STANDARD INTRAOCULAR LENS IMPLANT Right 03/01/2021    Procedure: Cataract removal with implant.;  Surgeon: Alma  Loyd De La Rosa MD;  Location: [facility] OR     TUBAL LIGATION         Prior to Admission Medications  Current Outpatient Medications   Medication Sig Dispense Refill     atorvastatin (LIPITOR) 80 MG tablet Take 80 mg by mouth every evening       empagliflozin (JARDIANCE) 10 MG TABS tablet Take 10 mg by mouth every morning       ezetimibe (ZETIA) 10 MG tablet Take 10 mg by mouth every morning       HYDROcodone-acetaminophen (NORCO) 5-325 MG tablet every 4 hours as needed       metFORMIN (GLUCOPHAGE XR) 500 MG 24 hr tablet Take 500 mg by mouth daily (with dinner)       metoprolol tartrate (LOPRESSOR) 50 MG tablet 2 times daily       telmisartan-HCTZ (MICARDIS HCT) 80-12.5 MG tablet Take 2 tablets by mouth every morning       aspirin 81 MG EC tablet Take 81 mg by mouth (Patient not taking: Reported on 7/11/2022)       cephALEXin (KEFLEX) 500 MG capsule TAKE 1 CAPSULE BY MOUTH THREE TIMES DAILY FOR 5 DAYS (Patient not taking: Reported on 5/23/2022)       Continuous Blood Gluc  (FREESTYLE RADHA 14 DAY READER) ANABEL CHECK BLOOD SUGARS FASTING AND ALTERNATING BEFORE SUPPER AND BEDTIME.       Continuous Blood Gluc Sensor (ACTONSTYLE RADHA 14 DAY SENSOR) MISC APPLY EVERY 14 DAYS TO CHECK BLOOD SUGARS       ibuprofen (ADVIL/MOTRIN) 200 MG tablet Take 400-600 mg by mouth (Patient not taking: Reported on 5/23/2022)       Multiple Vitamin (ONCE DAILY PO) Take 1 tablet by mouth (Patient not taking: Reported on 7/11/2022)       omega 3 1200 MG CAPS 2,400 mg (Patient not taking: Reported on 5/23/2022)       Omega-3 Fatty Acids (FISH OIL) 1200 MG capsule Take 2,400 mg by mouth once (Patient not taking: Reported on 7/11/2022)       oxybutynin ER (DITROPAN XL) 5 MG 24 hr tablet TAKE 1 TABLET BY MOUTH ONCE DAILY AT BEDTIME (Patient not taking: Reported on 7/11/2022)         Allergies  Allergies   Allergen Reactions     Codeine Other (See Comments)     Pasted out  Pasted out         Social History  Social History     Socioeconomic  History     Marital status: Single     Spouse name: Not on file     Number of children: Not on file     Years of education: Not on file     Highest education level: Not on file   Occupational History     Not on file   Tobacco Use     Smoking status: Never Smoker     Smokeless tobacco: Never Used   Substance and Sexual Activity     Alcohol use: Not Currently     Drug use: Never     Sexual activity: Not Currently   Other Topics Concern     Parent/sibling w/ CABG, MI or angioplasty before 65F 55M? Not Asked   Social History Narrative     Not on file     Social Determinants of Health     Financial Resource Strain: Not on file   Food Insecurity: Not on file   Transportation Needs: Not on file   Physical Activity: Not on file   Stress: Not on file   Social Connections: Not on file   Intimate Partner Violence: Not on file   Housing Stability: Not on file       Family History  Family History   Problem Relation Age of Onset     Anesthesia Reaction No family hx of      Deep Vein Thrombosis (DVT) No family hx of        Review of Systems  The complete review of systems is negative other than noted in the HPI or here.   Anesthesia Evaluation   Pt has had prior anesthetic.         ROS/MED HX  ENT/Pulmonary:     (+) sleep apnea, uses CPAP,  (-) tobacco use   Neurologic: Comment: RLS      Cardiovascular:     (+) hypertension-----Taking blood thinners Previous cardiac testing   Echo: Date: 3/2021 Results:  Final Conclusion    Visually Estimated EF: 60-65%    Technically difficult study - contrast was used to enhance endocardial definition due to   suboptimal image quality.    Normal LV size and systolic function.    Mitral annular calcification resulting in a mean mitral transvalvular gradient is 4 mmHg at 80   bpm.    Estimated pulmonary artery pressure of 21 mmHg + RA pressure.    Normal IVC size.     Stress Test: Date: 3/16/21 Results:  FINAL CONCLUSIONS   1. Myocardial perfusion imaging is NORMAL.   2. The stress ECG was  negative for ischemia   3. Overall left ventricular systolic function was normal without wall   motion abnormalities. The Left Ventricle Ejection Fraction was calculated   to be 58% (>55% is considered normal).   4. Calcium Score Total: 222.3      LMain: 0.0       LAD: 223.3       LCX:   0.0       RCA: 0.0       SANFORD: 79    (Reference norms based on patient   ages of 45-84: Minimal Calcium: 1-10; Mild Calcium: ; Moderate   Calcium: 101-400; High Calcium: 401 and above).   5. Please see radiology report for non-cardiac findings.       ECG Reviewed:  Date: 6/2022 Results:  SR, low voltage QRS, can't r/o anterior infarct  Cath:  Date: Results:      METS/Exercise Tolerance: 1 - Eating, dressing Comment: limited due to knee pain. Prior to knee pain, was riding stationary bike and regular walks last summer.    Hematologic:  - neg hematologic  ROS  (-) history of blood clots and history of blood transfusion   Musculoskeletal:   (+) arthritis,     GI/Hepatic:  - neg GI/hepatic ROS  (-) GERD   Renal/Genitourinary:  - neg Renal ROS     Endo:     (+) type II DM, Last HgA1c: 5.7, date: 6/27/22, Not using insulin, Obesity (BMI 45),     Psychiatric/Substance Use:  - neg psychiatric ROS  (-) psychiatric history   Infectious Disease:  - neg infectious disease ROS     Malignancy:  - neg malignancy ROS     Other:            Virtual visit -  No vitals were obtained    Physical Exam  Constitutional: Awake, alert, cooperative, no apparent distress, and appears stated age.  HENT: Normocephalic  Respiratory: non labored breathing   Neurologic: Awake, alert, oriented to name, place and time.   Neuropsychiatric: Calm, cooperative. Normal affect.      Prior Labs/Diagnostic Studies   All labs and imaging personally reviewed     EKG/ stress test - if available please see in ROS above   No results found.  No flowsheet data found.      The patient's records and results personally reviewed by this provider.     Outside records reviewed  "from: Care Everywhere      Assessment      Jluis Duke is a 71 year old female seen as a PAC referral for risk assessment and optimization for anesthesia.    Plan/Recommendations  Pt will be optimized for the proposed procedure.  See below for details on the assessment, risk, and preoperative recommendations    NEUROLOGY  - No history of TIA, CVA or seizure  -Post Op delirium risk factors:  No risk identified    ENT  - No current airway concerns.  Will need to be reassessed day of surgery.  Mallampati: Unable to assess  TM: Unable to assess    CARDIAC  - No history of Afib  - Hypertension  -patient denies cardiac symptoms. Limited activity secondary to knee pain over past year. See previous cardiac testing above.   - METS (Metabolic Equivalents)  Patient CANNOT perform 4 METS exercise without symptoms            Total Score: 1    Functional Capacity: Unable to complete 4 METS      RCRI-Very low risk: Class 1 0.4% complication rate            Total Score: 0        PULMONARY  - Obstructive Sleep Apnea  MELVINA with home CPAP.  Patient will be instructed to bring their home CPAP device to the hospital with them.  - Denies asthma or inhaler use  - Tobacco History      History   Smoking Status     Never Smoker   Smokeless Tobacco     Never Used       GI  PONV High Risk  Total Score: 3           1 AN PONV: Pt is Female    1 AN PONV: Patient is not a current smoker    1 AN PONV: Intended Post Op Opioids        /RENAL  - Baseline Creatinine  WNL    ENDOCRINE    - BMI: Estimated body mass index is 45.64 kg/m  as calculated from the following:    Height as of 5/23/22: 1.57 m (5' 1.81\").    Weight as of 5/23/22: 112.5 kg (248 lb).  Class 3 Obesity (BMI > 40)  - Diabetes  Diabetes Mellitus, Type 2, non-insulin dependent.  Hold morning oral hypoglycemic medications. Recommend close monitoring of the patient's blood glucose levels throughout the perioperative period. A1c was 5.7 6/27/22.     HEME  VTE Low Risk 0.5%            " Total Score: 2    VTE: Greater than 59 yrs old    VTE: BMI greater than 39      - Platelet disfunction second to Aspirin (Jani, many others)  -as this is a virtual visit and surgery is tomorrow morning, will order T&S for preop DOS    MSK  -OA with above procedure planned     The patient is optimized for their procedure. AVS with information on surgery time/arrival time, meds and NPO status given by nursing staff. No further diagnostic testing indicated.    Please refer to the physical examination documented by the anesthesiologist in the anesthesia record on the day of surgery.    Video-Visit Details    Type of service:  Video Visit    Patient verbally consented to video service today: YES    Video Start Time: 29307  Video End Time (time video stopped): 1553    Originating Location (pt. Location): Home    Distant Location (provider location): home    Mode of Communication:  Video Conference via D[employer]imity- changed to telephone due to technical difficulty   On the day of service:     Prep time: 11 minutes  Visit time: 14 minutes  Documentation time: 8 minutes  ------------------------------------------  Total time: 33 minutes      Bita Jacobo PA-C  Preoperative Assessment Center  [facility]  Clinic and Surgery Center  Phone: [phone]  Fax: [phone]

## 2022-07-11 NOTE — H&P
Pre-Operative H & P     CC:  Preoperative exam to assess for increased cardiopulmonary risk while undergoing surgery and anesthesia.    Date of Encounter: 7/11/2022  Primary Care Physician:  Olivia Aviles     Reason for visit:   Encounter Diagnosis   Name Primary?     Pre-op evaluation Yes       HPI  Jluis Duke is a 71 year old female who presents for pre-operative H & P in preparation for  Procedure Information     Case: [id number] Date/Time: 07/12/22 0910    Procedure: Revision Left total knee arthroplasty (Left Knee)    Anesthesia type: General    Diagnosis: Chronic pain of left knee [M25.562, G89.29]    Pre-op diagnosis: Chronic pain of left knee [M25.562, G89.29]    Location:  OR 14 / [facility]    Providers: Minesh Mayo MD          Patient is being evaluated for comorbid conditions of Hypertension, MELVINA, diabetes, obesity, RLS    Ms. Duke has a history of bilateral TKA in 2008. She was doing well until about 1 year ago when she developed left knee discomfort. This pain has been progressively worsening since that time. She was seen by Dr. Mayo for further evaluation and is now scheduled for the above procedure.     History is obtained from the patient and chart review    Hx of abnormal bleeding or anti-platelet use: ASA    menstrual history: No LMP recorded. Patient is postmenopausal.     Past Medical History  Past Medical History:   Diagnosis Date     Diabetes (H)      Hypertension      Sleep apnea        Past Surgical History  Past Surgical History:   Procedure Laterality Date     CHOLECYSTECTOMY       COLONOSCOPY       ORTHOPEDIC SURGERY Bilateral     2008 bilateral knees     PHACOEMULSIFICATION WITH STANDARD INTRAOCULAR LENS IMPLANT Left 02/10/2021    Procedure: Cataract removal with implant.;  Surgeon: Loyd Marquez MD;  Location: [facility]     PHACOEMULSIFICATION WITH STANDARD INTRAOCULAR LENS IMPLANT Right 03/01/2021    Procedure: Cataract removal with implant.;  Surgeon: Alma  Loyd De La Rosa MD;  Location: [facility] OR     TUBAL LIGATION         Prior to Admission Medications  Current Outpatient Medications   Medication Sig Dispense Refill     atorvastatin (LIPITOR) 80 MG tablet Take 80 mg by mouth every evening       empagliflozin (JARDIANCE) 10 MG TABS tablet Take 10 mg by mouth every morning       ezetimibe (ZETIA) 10 MG tablet Take 10 mg by mouth every morning       HYDROcodone-acetaminophen (NORCO) 5-325 MG tablet every 4 hours as needed       metFORMIN (GLUCOPHAGE XR) 500 MG 24 hr tablet Take 500 mg by mouth daily (with dinner)       metoprolol tartrate (LOPRESSOR) 50 MG tablet 2 times daily       telmisartan-HCTZ (MICARDIS HCT) 80-12.5 MG tablet Take 2 tablets by mouth every morning       aspirin 81 MG EC tablet Take 81 mg by mouth (Patient not taking: Reported on 7/11/2022)       cephALEXin (KEFLEX) 500 MG capsule TAKE 1 CAPSULE BY MOUTH THREE TIMES DAILY FOR 5 DAYS (Patient not taking: Reported on 5/23/2022)       Continuous Blood Gluc  (FREESTYLE RADHA 14 DAY READER) ANABEL CHECK BLOOD SUGARS FASTING AND ALTERNATING BEFORE SUPPER AND BEDTIME.       Continuous Blood Gluc Sensor (MyRegistry.comSTYLE RADHA 14 DAY SENSOR) MISC APPLY EVERY 14 DAYS TO CHECK BLOOD SUGARS       ibuprofen (ADVIL/MOTRIN) 200 MG tablet Take 400-600 mg by mouth (Patient not taking: Reported on 5/23/2022)       Multiple Vitamin (ONCE DAILY PO) Take 1 tablet by mouth (Patient not taking: Reported on 7/11/2022)       omega 3 1200 MG CAPS 2,400 mg (Patient not taking: Reported on 5/23/2022)       Omega-3 Fatty Acids (FISH OIL) 1200 MG capsule Take 2,400 mg by mouth once (Patient not taking: Reported on 7/11/2022)       oxybutynin ER (DITROPAN XL) 5 MG 24 hr tablet TAKE 1 TABLET BY MOUTH ONCE DAILY AT BEDTIME (Patient not taking: Reported on 7/11/2022)         Allergies  Allergies   Allergen Reactions     Codeine Other (See Comments)     Pasted out  Pasted out         Social History  Social History     Socioeconomic  History     Marital status: Single     Spouse name: Not on file     Number of children: Not on file     Years of education: Not on file     Highest education level: Not on file   Occupational History     Not on file   Tobacco Use     Smoking status: Never Smoker     Smokeless tobacco: Never Used   Substance and Sexual Activity     Alcohol use: Not Currently     Drug use: Never     Sexual activity: Not Currently   Other Topics Concern     Parent/sibling w/ CABG, MI or angioplasty before 65F 55M? Not Asked   Social History Narrative     Not on file     Social Determinants of Health     Financial Resource Strain: Not on file   Food Insecurity: Not on file   Transportation Needs: Not on file   Physical Activity: Not on file   Stress: Not on file   Social Connections: Not on file   Intimate Partner Violence: Not on file   Housing Stability: Not on file       Family History  Family History   Problem Relation Age of Onset     Anesthesia Reaction No family hx of      Deep Vein Thrombosis (DVT) No family hx of        Review of Systems  The complete review of systems is negative other than noted in the HPI or here.   Anesthesia Evaluation   Pt has had prior anesthetic.         ROS/MED HX  ENT/Pulmonary:     (+) sleep apnea, uses CPAP,  (-) tobacco use   Neurologic: Comment: RLS      Cardiovascular:     (+) hypertension-----Taking blood thinners Previous cardiac testing   Echo: Date: 3/2021 Results:  Final Conclusion    Visually Estimated EF: 60-65%    Technically difficult study - contrast was used to enhance endocardial definition due to   suboptimal image quality.    Normal LV size and systolic function.    Mitral annular calcification resulting in a mean mitral transvalvular gradient is 4 mmHg at 80   bpm.    Estimated pulmonary artery pressure of 21 mmHg + RA pressure.    Normal IVC size.     Stress Test: Date: 3/16/21 Results:  FINAL CONCLUSIONS   1. Myocardial perfusion imaging is NORMAL.   2. The stress ECG was  negative for ischemia   3. Overall left ventricular systolic function was normal without wall   motion abnormalities. The Left Ventricle Ejection Fraction was calculated   to be 58% (>55% is considered normal).   4. Calcium Score Total: 222.3      LMain: 0.0       LAD: 223.3       LCX:   0.0       RCA: 0.0       SANFORD: 79    (Reference norms based on patient   ages of 45-84: Minimal Calcium: 1-10; Mild Calcium: ; Moderate   Calcium: 101-400; High Calcium: 401 and above).   5. Please see radiology report for non-cardiac findings.       ECG Reviewed:  Date: 6/2022 Results:  SR, low voltage QRS, can't r/o anterior infarct  Cath:  Date: Results:      METS/Exercise Tolerance: 1 - Eating, dressing Comment: limited due to knee pain. Prior to knee pain, was riding stationary bike and regular walks last summer.    Hematologic:  - neg hematologic  ROS  (-) history of blood clots and history of blood transfusion   Musculoskeletal:   (+) arthritis,     GI/Hepatic:  - neg GI/hepatic ROS  (-) GERD   Renal/Genitourinary:  - neg Renal ROS     Endo:     (+) type II DM, Last HgA1c: 5.7, date: 6/27/22, Not using insulin, Obesity (BMI 45),     Psychiatric/Substance Use:  - neg psychiatric ROS  (-) psychiatric history   Infectious Disease:  - neg infectious disease ROS     Malignancy:  - neg malignancy ROS     Other:            Virtual visit -  No vitals were obtained    Physical Exam  Constitutional: Awake, alert, cooperative, no apparent distress, and appears stated age.  HENT: Normocephalic  Respiratory: non labored breathing   Neurologic: Awake, alert, oriented to name, place and time.   Neuropsychiatric: Calm, cooperative. Normal affect.      Prior Labs/Diagnostic Studies   All labs and imaging personally reviewed     EKG/ stress test - if available please see in ROS above   No results found.  No flowsheet data found.      The patient's records and results personally reviewed by this provider.     Outside records reviewed  "from: Care Everywhere      Assessment      Jluis Duke is a 71 year old female seen as a PAC referral for risk assessment and optimization for anesthesia.    Plan/Recommendations  Pt will be optimized for the proposed procedure.  See below for details on the assessment, risk, and preoperative recommendations    NEUROLOGY  - No history of TIA, CVA or seizure  -Post Op delirium risk factors:  No risk identified    ENT  - No current airway concerns.  Will need to be reassessed day of surgery.  Mallampati: Unable to assess  TM: Unable to assess    CARDIAC  - No history of Afib  - Hypertension  -patient denies cardiac symptoms. Limited activity secondary to knee pain over past year. See previous cardiac testing above.   - METS (Metabolic Equivalents)  Patient CANNOT perform 4 METS exercise without symptoms            Total Score: 1    Functional Capacity: Unable to complete 4 METS      RCRI-Very low risk: Class 1 0.4% complication rate            Total Score: 0        PULMONARY  - Obstructive Sleep Apnea  MELVINA with home CPAP.  Patient will be instructed to bring their home CPAP device to the hospital with them.  - Denies asthma or inhaler use  - Tobacco History      History   Smoking Status     Never Smoker   Smokeless Tobacco     Never Used       GI  PONV High Risk  Total Score: 3           1 AN PONV: Pt is Female    1 AN PONV: Patient is not a current smoker    1 AN PONV: Intended Post Op Opioids        /RENAL  - Baseline Creatinine  WNL    ENDOCRINE    - BMI: Estimated body mass index is 45.64 kg/m  as calculated from the following:    Height as of 5/23/22: 1.57 m (5' 1.81\").    Weight as of 5/23/22: 112.5 kg (248 lb).  Class 3 Obesity (BMI > 40)  - Diabetes  Diabetes Mellitus, Type 2, non-insulin dependent.  Hold morning oral hypoglycemic medications. Recommend close monitoring of the patient's blood glucose levels throughout the perioperative period. A1c was 5.7 6/27/22.     HEME  VTE Low Risk 0.5%            " Total Score: 2    VTE: Greater than 59 yrs old    VTE: BMI greater than 39      - Platelet disfunction second to Aspirin (Jani, many others)  -as this is a virtual visit and surgery is tomorrow morning, will order T&S for preop DOS    MSK  -OA with above procedure planned     The patient is optimized for their procedure. AVS with information on surgery time/arrival time, meds and NPO status given by nursing staff. No further diagnostic testing indicated.    Please refer to the physical examination documented by the anesthesiologist in the anesthesia record on the day of surgery.    Video-Visit Details    Type of service:  Video Visit    Patient verbally consented to video service today: YES    Video Start Time: 06899  Video End Time (time video stopped): 1553    Originating Location (pt. Location): Home    Distant Location (provider location): home    Mode of Communication:  Video Conference via Doximity- changed to telephone due to technical difficulty   On the day of service:     Prep time: 11 minutes  Visit time: 14 minutes  Documentation time: 8 minutes  ------------------------------------------  Total time: 33 minutes      Bita Jacobo PA-C  Preoperative Assessment Center  [facility]  Phone: [phone]  Fax: [phone]

## 2022-07-12 ENCOUNTER — HOSPITAL ENCOUNTER (INPATIENT)
Facility: CLINIC | Age: 72
LOS: 1 days | Discharge: HOME | DRG: 467 | End: 2022-07-13
Attending: ORTHOPAEDIC SURGERY | Admitting: ORTHOPAEDIC SURGERY
Payer: COMMERCIAL

## 2022-07-12 ENCOUNTER — APPOINTMENT (OUTPATIENT)
Dept: GENERAL RADIOLOGY | Facility: CLINIC | Age: 72
DRG: 467 | End: 2022-07-12
Attending: PHYSICIAN ASSISTANT
Payer: COMMERCIAL

## 2022-07-12 ENCOUNTER — ANESTHESIA (OUTPATIENT)
Dept: SURGERY | Facility: CLINIC | Age: 72
DRG: 467 | End: 2022-07-12
Payer: COMMERCIAL

## 2022-07-12 DIAGNOSIS — Z96.652 S/P REVISION OF TOTAL KNEE, LEFT: Primary | ICD-10-CM

## 2022-07-12 LAB
ABO/RH(D): NORMAL
ANTIBODY SCREEN: NEGATIVE
GLUCOSE BLDC GLUCOMTR-MCNC: 118 MG/DL (ref 70–99)
GLUCOSE BLDC GLUCOMTR-MCNC: 125 MG/DL (ref 70–99)
GLUCOSE BLDC GLUCOMTR-MCNC: 154 MG/DL (ref 70–99)
SPECIMEN EXPIRATION DATE: NORMAL

## 2022-07-12 PROCEDURE — 370N000017 HC ANESTHESIA TECHNICAL FEE, PER MIN: Performed by: ORTHOPAEDIC SURGERY

## 2022-07-12 PROCEDURE — 710N000010 HC RECOVERY PHASE 1, LEVEL 2, PER MIN: Performed by: ORTHOPAEDIC SURGERY

## 2022-07-12 PROCEDURE — 120N000002 HC R&B MED SURG/OB UMMC

## 2022-07-12 PROCEDURE — 250N000013 HC RX MED GY IP 250 OP 250 PS 637: Performed by: PHYSICIAN ASSISTANT

## 2022-07-12 PROCEDURE — 0SPD09Z REMOVAL OF LINER FROM LEFT KNEE JOINT, OPEN APPROACH: ICD-10-PCS | Performed by: INTERNAL MEDICINE

## 2022-07-12 PROCEDURE — 0SRW0J9 REPLACEMENT OF LEFT KNEE JOINT, TIBIAL SURFACE WITH SYNTHETIC SUBSTITUTE, CEMENTED, OPEN APPROACH: ICD-10-PCS | Performed by: INTERNAL MEDICINE

## 2022-07-12 PROCEDURE — 250N000009 HC RX 250: Performed by: ORTHOPAEDIC SURGERY

## 2022-07-12 PROCEDURE — 360N000078 HC SURGERY LEVEL 5, PER MIN: Performed by: ORTHOPAEDIC SURGERY

## 2022-07-12 PROCEDURE — 250N000011 HC RX IP 250 OP 636: Performed by: PHYSICIAN ASSISTANT

## 2022-07-12 PROCEDURE — 999N000065 XR KNEE PORT LEFT 1/2 VIEWS: Mod: LT

## 2022-07-12 PROCEDURE — 27486 REVISE/REPLACE KNEE JOINT: CPT | Mod: LT | Performed by: ORTHOPAEDIC SURGERY

## 2022-07-12 PROCEDURE — 250N000025 HC SEVOFLURANE, PER MIN: Performed by: ORTHOPAEDIC SURGERY

## 2022-07-12 PROCEDURE — 250N000013 HC RX MED GY IP 250 OP 250 PS 637: Performed by: INTERNAL MEDICINE

## 2022-07-12 PROCEDURE — 258N000001 HC RX 258: Performed by: ORTHOPAEDIC SURGERY

## 2022-07-12 PROCEDURE — 86901 BLOOD TYPING SEROLOGIC RH(D): CPT | Performed by: PHYSICIAN ASSISTANT

## 2022-07-12 PROCEDURE — 99204 OFFICE O/P NEW MOD 45 MIN: CPT | Performed by: INTERNAL MEDICINE

## 2022-07-12 PROCEDURE — 0SPW0JZ REMOVAL OF SYNTHETIC SUBSTITUTE FROM LEFT KNEE JOINT, TIBIAL SURFACE, OPEN APPROACH: ICD-10-PCS | Performed by: INTERNAL MEDICINE

## 2022-07-12 PROCEDURE — 87075 CULTR BACTERIA EXCEPT BLOOD: CPT | Performed by: ORTHOPAEDIC SURGERY

## 2022-07-12 PROCEDURE — 250N000009 HC RX 250: Performed by: NURSE ANESTHETIST, CERTIFIED REGISTERED

## 2022-07-12 PROCEDURE — 87176 TISSUE HOMOGENIZATION CULTR: CPT | Performed by: ORTHOPAEDIC SURGERY

## 2022-07-12 PROCEDURE — 250N000011 HC RX IP 250 OP 636: Performed by: ANESTHESIOLOGY

## 2022-07-12 PROCEDURE — 250N000011 HC RX IP 250 OP 636: Performed by: NURSE ANESTHETIST, CERTIFIED REGISTERED

## 2022-07-12 PROCEDURE — C1713 ANCHOR/SCREW BN/BN,TIS/BN: HCPCS | Performed by: ORTHOPAEDIC SURGERY

## 2022-07-12 PROCEDURE — 258N000003 HC RX IP 258 OP 636: Performed by: NURSE ANESTHETIST, CERTIFIED REGISTERED

## 2022-07-12 PROCEDURE — 999N000141 HC STATISTIC PRE-PROCEDURE NURSING ASSESSMENT: Performed by: ORTHOPAEDIC SURGERY

## 2022-07-12 PROCEDURE — 0SUW09Z SUPPLEMENT LEFT KNEE JOINT, TIBIAL SURFACE WITH LINER, OPEN APPROACH: ICD-10-PCS | Performed by: INTERNAL MEDICINE

## 2022-07-12 PROCEDURE — 82962 GLUCOSE BLOOD TEST: CPT

## 2022-07-12 PROCEDURE — 258N000003 HC RX IP 258 OP 636: Performed by: PHYSICIAN ASSISTANT

## 2022-07-12 PROCEDURE — 272N000001 HC OR GENERAL SUPPLY STERILE: Performed by: ORTHOPAEDIC SURGERY

## 2022-07-12 PROCEDURE — 73560 X-RAY EXAM OF KNEE 1 OR 2: CPT | Mod: 26 | Performed by: RADIOLOGY

## 2022-07-12 PROCEDURE — C1776 JOINT DEVICE (IMPLANTABLE): HCPCS | Performed by: ORTHOPAEDIC SURGERY

## 2022-07-12 DEVICE — TOBRA FULL DOSE ANTIBIOTIC BONE CEMENT, 10 PACK CATALOG NUMBER IS 6197-9-010
Type: IMPLANTABLE DEVICE | Site: KNEE | Status: FUNCTIONAL
Brand: SIMPLEX

## 2022-07-12 DEVICE — IMPLANTABLE DEVICE
Type: IMPLANTABLE DEVICE | Site: KNEE | Status: FUNCTIONAL
Brand: NEXGEN®

## 2022-07-12 DEVICE — IMPLANTABLE DEVICE
Type: IMPLANTABLE DEVICE | Site: KNEE | Status: FUNCTIONAL
Brand: NEXGEN® LEGACY® PROLONG®

## 2022-07-12 RX ORDER — SODIUM CHLORIDE, SODIUM LACTATE, POTASSIUM CHLORIDE, CALCIUM CHLORIDE 600; 310; 30; 20 MG/100ML; MG/100ML; MG/100ML; MG/100ML
INJECTION, SOLUTION INTRAVENOUS CONTINUOUS
Status: DISCONTINUED | OUTPATIENT
Start: 2022-07-12 | End: 2022-07-12 | Stop reason: HOSPADM

## 2022-07-12 RX ORDER — ONDANSETRON 4 MG/1
4 TABLET, ORALLY DISINTEGRATING ORAL EVERY 30 MIN PRN
Status: DISCONTINUED | OUTPATIENT
Start: 2022-07-12 | End: 2022-07-12 | Stop reason: HOSPADM

## 2022-07-12 RX ORDER — HYDROMORPHONE HYDROCHLORIDE 1 MG/ML
0.2 INJECTION, SOLUTION INTRAMUSCULAR; INTRAVENOUS; SUBCUTANEOUS EVERY 5 MIN PRN
Status: DISCONTINUED | OUTPATIENT
Start: 2022-07-12 | End: 2022-07-12 | Stop reason: HOSPADM

## 2022-07-12 RX ORDER — LIDOCAINE 40 MG/G
CREAM TOPICAL
Status: DISCONTINUED | OUTPATIENT
Start: 2022-07-12 | End: 2022-07-12 | Stop reason: HOSPADM

## 2022-07-12 RX ORDER — BISACODYL 10 MG
10 SUPPOSITORY, RECTAL RECTAL DAILY PRN
Status: DISCONTINUED | OUTPATIENT
Start: 2022-07-12 | End: 2022-07-13 | Stop reason: HOSPADM

## 2022-07-12 RX ORDER — CEFAZOLIN SODIUM/WATER 2 G/20 ML
2 SYRINGE (ML) INTRAVENOUS
Status: COMPLETED | OUTPATIENT
Start: 2022-07-12 | End: 2022-07-12

## 2022-07-12 RX ORDER — ONDANSETRON 4 MG/1
4 TABLET, ORALLY DISINTEGRATING ORAL EVERY 6 HOURS PRN
Status: DISCONTINUED | OUTPATIENT
Start: 2022-07-12 | End: 2022-07-13 | Stop reason: HOSPADM

## 2022-07-12 RX ORDER — HYDROXYZINE HYDROCHLORIDE 10 MG/1
10 TABLET, FILM COATED ORAL EVERY 6 HOURS PRN
Status: DISCONTINUED | OUTPATIENT
Start: 2022-07-12 | End: 2022-07-13 | Stop reason: HOSPADM

## 2022-07-12 RX ORDER — FENTANYL CITRATE 50 UG/ML
25 INJECTION, SOLUTION INTRAMUSCULAR; INTRAVENOUS EVERY 5 MIN PRN
Status: DISCONTINUED | OUTPATIENT
Start: 2022-07-12 | End: 2022-07-12 | Stop reason: HOSPADM

## 2022-07-12 RX ORDER — HYDROMORPHONE HCL IN WATER/PF 6 MG/30 ML
0.2 PATIENT CONTROLLED ANALGESIA SYRINGE INTRAVENOUS
Status: DISCONTINUED | OUTPATIENT
Start: 2022-07-12 | End: 2022-07-13 | Stop reason: HOSPADM

## 2022-07-12 RX ORDER — METFORMIN HCL 500 MG
500 TABLET, EXTENDED RELEASE 24 HR ORAL
Status: DISCONTINUED | OUTPATIENT
Start: 2022-07-13 | End: 2022-07-13 | Stop reason: HOSPADM

## 2022-07-12 RX ORDER — ACETAMINOPHEN 325 MG/1
975 TABLET ORAL ONCE
Status: DISCONTINUED | OUTPATIENT
Start: 2022-07-12 | End: 2022-07-12 | Stop reason: HOSPADM

## 2022-07-12 RX ORDER — ONDANSETRON 2 MG/ML
INJECTION INTRAMUSCULAR; INTRAVENOUS PRN
Status: DISCONTINUED | OUTPATIENT
Start: 2022-07-12 | End: 2022-07-12

## 2022-07-12 RX ORDER — ONDANSETRON 2 MG/ML
4 INJECTION INTRAMUSCULAR; INTRAVENOUS EVERY 6 HOURS PRN
Status: DISCONTINUED | OUTPATIENT
Start: 2022-07-12 | End: 2022-07-13 | Stop reason: HOSPADM

## 2022-07-12 RX ORDER — MAGNESIUM HYDROXIDE 1200 MG/15ML
LIQUID ORAL PRN
Status: DISCONTINUED | OUTPATIENT
Start: 2022-07-12 | End: 2022-07-12 | Stop reason: HOSPADM

## 2022-07-12 RX ORDER — ACETAMINOPHEN 325 MG/1
975 TABLET ORAL ONCE
Status: COMPLETED | OUTPATIENT
Start: 2022-07-12 | End: 2022-07-12

## 2022-07-12 RX ORDER — LIDOCAINE 40 MG/G
CREAM TOPICAL
Status: DISCONTINUED | OUTPATIENT
Start: 2022-07-12 | End: 2022-07-13 | Stop reason: HOSPADM

## 2022-07-12 RX ORDER — METHOCARBAMOL 500 MG/1
500 TABLET, FILM COATED ORAL EVERY 6 HOURS PRN
Status: DISCONTINUED | OUTPATIENT
Start: 2022-07-12 | End: 2022-07-13 | Stop reason: HOSPADM

## 2022-07-12 RX ORDER — PROPOFOL 10 MG/ML
INJECTION, EMULSION INTRAVENOUS CONTINUOUS PRN
Status: DISCONTINUED | OUTPATIENT
Start: 2022-07-12 | End: 2022-07-12

## 2022-07-12 RX ORDER — CELECOXIB 200 MG/1
400 CAPSULE ORAL ONCE
Status: COMPLETED | OUTPATIENT
Start: 2022-07-12 | End: 2022-07-12

## 2022-07-12 RX ORDER — FENTANYL CITRATE 50 UG/ML
INJECTION, SOLUTION INTRAMUSCULAR; INTRAVENOUS PRN
Status: DISCONTINUED | OUTPATIENT
Start: 2022-07-12 | End: 2022-07-12

## 2022-07-12 RX ORDER — METOPROLOL TARTRATE 50 MG
50 TABLET ORAL 2 TIMES DAILY
Status: DISCONTINUED | OUTPATIENT
Start: 2022-07-12 | End: 2022-07-12

## 2022-07-12 RX ORDER — ASPIRIN 81 MG/1
162 TABLET ORAL DAILY
Status: DISCONTINUED | OUTPATIENT
Start: 2022-07-13 | End: 2022-07-13 | Stop reason: HOSPADM

## 2022-07-12 RX ORDER — DEXAMETHASONE SODIUM PHOSPHATE 4 MG/ML
INJECTION, SOLUTION INTRA-ARTICULAR; INTRALESIONAL; INTRAMUSCULAR; INTRAVENOUS; SOFT TISSUE PRN
Status: DISCONTINUED | OUTPATIENT
Start: 2022-07-12 | End: 2022-07-12

## 2022-07-12 RX ORDER — PROCHLORPERAZINE MALEATE 5 MG
5 TABLET ORAL EVERY 6 HOURS PRN
Status: DISCONTINUED | OUTPATIENT
Start: 2022-07-12 | End: 2022-07-13 | Stop reason: HOSPADM

## 2022-07-12 RX ORDER — CEPHALEXIN 500 MG/1
500 CAPSULE ORAL 4 TIMES DAILY
Qty: 56 CAPSULE | Refills: 0 | Status: SHIPPED | OUTPATIENT
Start: 2022-07-12 | End: 2022-07-26

## 2022-07-12 RX ORDER — AMOXICILLIN 250 MG
1 CAPSULE ORAL 2 TIMES DAILY
Status: DISCONTINUED | OUTPATIENT
Start: 2022-07-12 | End: 2022-07-13 | Stop reason: HOSPADM

## 2022-07-12 RX ORDER — PROPOFOL 10 MG/ML
INJECTION, EMULSION INTRAVENOUS PRN
Status: DISCONTINUED | OUTPATIENT
Start: 2022-07-12 | End: 2022-07-12

## 2022-07-12 RX ORDER — LIDOCAINE HYDROCHLORIDE 20 MG/ML
INJECTION, SOLUTION INFILTRATION; PERINEURAL PRN
Status: DISCONTINUED | OUTPATIENT
Start: 2022-07-12 | End: 2022-07-12

## 2022-07-12 RX ORDER — NALOXONE HYDROCHLORIDE 0.4 MG/ML
0.4 INJECTION, SOLUTION INTRAMUSCULAR; INTRAVENOUS; SUBCUTANEOUS
Status: DISCONTINUED | OUTPATIENT
Start: 2022-07-12 | End: 2022-07-13 | Stop reason: HOSPADM

## 2022-07-12 RX ORDER — HYDROMORPHONE HCL IN WATER/PF 6 MG/30 ML
0.4 PATIENT CONTROLLED ANALGESIA SYRINGE INTRAVENOUS
Status: DISCONTINUED | OUTPATIENT
Start: 2022-07-12 | End: 2022-07-13 | Stop reason: HOSPADM

## 2022-07-12 RX ORDER — OXYBUTYNIN CHLORIDE 5 MG/1
5 TABLET, EXTENDED RELEASE ORAL AT BEDTIME
Status: DISCONTINUED | OUTPATIENT
Start: 2022-07-12 | End: 2022-07-12

## 2022-07-12 RX ORDER — NALOXONE HYDROCHLORIDE 0.4 MG/ML
0.2 INJECTION, SOLUTION INTRAMUSCULAR; INTRAVENOUS; SUBCUTANEOUS
Status: DISCONTINUED | OUTPATIENT
Start: 2022-07-12 | End: 2022-07-13 | Stop reason: HOSPADM

## 2022-07-12 RX ORDER — ONDANSETRON 2 MG/ML
4 INJECTION INTRAMUSCULAR; INTRAVENOUS EVERY 30 MIN PRN
Status: DISCONTINUED | OUTPATIENT
Start: 2022-07-12 | End: 2022-07-12 | Stop reason: HOSPADM

## 2022-07-12 RX ORDER — OXYCODONE HYDROCHLORIDE 5 MG/1
5 TABLET ORAL EVERY 4 HOURS PRN
Status: DISCONTINUED | OUTPATIENT
Start: 2022-07-12 | End: 2022-07-13

## 2022-07-12 RX ORDER — ATORVASTATIN CALCIUM 80 MG/1
80 TABLET, FILM COATED ORAL EVERY EVENING
Status: DISCONTINUED | OUTPATIENT
Start: 2022-07-12 | End: 2022-07-13 | Stop reason: HOSPADM

## 2022-07-12 RX ORDER — CEFAZOLIN SODIUM 2 G/100ML
2 INJECTION, SOLUTION INTRAVENOUS EVERY 8 HOURS
Status: DISCONTINUED | OUTPATIENT
Start: 2022-07-12 | End: 2022-07-13 | Stop reason: HOSPADM

## 2022-07-12 RX ORDER — TRANEXAMIC ACID 650 MG/1
1950 TABLET ORAL ONCE
Status: COMPLETED | OUTPATIENT
Start: 2022-07-12 | End: 2022-07-12

## 2022-07-12 RX ORDER — POLYETHYLENE GLYCOL 3350 17 G/17G
17 POWDER, FOR SOLUTION ORAL DAILY
Status: DISCONTINUED | OUTPATIENT
Start: 2022-07-13 | End: 2022-07-13 | Stop reason: HOSPADM

## 2022-07-12 RX ORDER — EZETIMIBE 10 MG/1
10 TABLET ORAL EVERY MORNING
Status: DISCONTINUED | OUTPATIENT
Start: 2022-07-13 | End: 2022-07-13 | Stop reason: HOSPADM

## 2022-07-12 RX ORDER — SODIUM CHLORIDE, SODIUM LACTATE, POTASSIUM CHLORIDE, CALCIUM CHLORIDE 600; 310; 30; 20 MG/100ML; MG/100ML; MG/100ML; MG/100ML
INJECTION, SOLUTION INTRAVENOUS CONTINUOUS
Status: DISCONTINUED | OUTPATIENT
Start: 2022-07-12 | End: 2022-07-13 | Stop reason: HOSPADM

## 2022-07-12 RX ORDER — SODIUM CHLORIDE, SODIUM LACTATE, POTASSIUM CHLORIDE, CALCIUM CHLORIDE 600; 310; 30; 20 MG/100ML; MG/100ML; MG/100ML; MG/100ML
INJECTION, SOLUTION INTRAVENOUS CONTINUOUS PRN
Status: DISCONTINUED | OUTPATIENT
Start: 2022-07-12 | End: 2022-07-12

## 2022-07-12 RX ORDER — ACETAMINOPHEN 325 MG/1
650 TABLET ORAL EVERY 4 HOURS PRN
Status: DISCONTINUED | OUTPATIENT
Start: 2022-07-15 | End: 2022-07-13 | Stop reason: HOSPADM

## 2022-07-12 RX ORDER — OXYCODONE HYDROCHLORIDE 5 MG/1
10 TABLET ORAL EVERY 4 HOURS PRN
Status: DISCONTINUED | OUTPATIENT
Start: 2022-07-12 | End: 2022-07-13

## 2022-07-12 RX ORDER — OXYCODONE HYDROCHLORIDE 5 MG/1
5 TABLET ORAL EVERY 4 HOURS PRN
Status: DISCONTINUED | OUTPATIENT
Start: 2022-07-12 | End: 2022-07-12 | Stop reason: HOSPADM

## 2022-07-12 RX ORDER — CEFAZOLIN SODIUM/WATER 2 G/20 ML
2 SYRINGE (ML) INTRAVENOUS SEE ADMIN INSTRUCTIONS
Status: DISCONTINUED | OUTPATIENT
Start: 2022-07-12 | End: 2022-07-12 | Stop reason: HOSPADM

## 2022-07-12 RX ORDER — ACETAMINOPHEN 325 MG/1
975 TABLET ORAL EVERY 8 HOURS
Status: DISCONTINUED | OUTPATIENT
Start: 2022-07-12 | End: 2022-07-13 | Stop reason: HOSPADM

## 2022-07-12 RX ADMIN — ACETAMINOPHEN 975 MG: 325 TABLET, FILM COATED ORAL at 07:22

## 2022-07-12 RX ADMIN — ONDANSETRON 4 MG: 2 INJECTION INTRAMUSCULAR; INTRAVENOUS at 13:44

## 2022-07-12 RX ADMIN — HYDROMORPHONE HYDROCHLORIDE 0.2 MG: 0.2 INJECTION, SOLUTION INTRAMUSCULAR; INTRAVENOUS; SUBCUTANEOUS at 23:04

## 2022-07-12 RX ADMIN — OXYCODONE HYDROCHLORIDE 10 MG: 5 TABLET ORAL at 16:39

## 2022-07-12 RX ADMIN — PROPOFOL 50 MG: 10 INJECTION, EMULSION INTRAVENOUS at 11:51

## 2022-07-12 RX ADMIN — HYDROMORPHONE HYDROCHLORIDE 0.2 MG: 1 INJECTION, SOLUTION INTRAMUSCULAR; INTRAVENOUS; SUBCUTANEOUS at 15:21

## 2022-07-12 RX ADMIN — HYDROMORPHONE HYDROCHLORIDE 0.5 MG: 1 INJECTION, SOLUTION INTRAMUSCULAR; INTRAVENOUS; SUBCUTANEOUS at 12:07

## 2022-07-12 RX ADMIN — SODIUM CHLORIDE, POTASSIUM CHLORIDE, SODIUM LACTATE AND CALCIUM CHLORIDE: 600; 310; 30; 20 INJECTION, SOLUTION INTRAVENOUS at 19:59

## 2022-07-12 RX ADMIN — HYDROMORPHONE HYDROCHLORIDE 0.2 MG: 1 INJECTION, SOLUTION INTRAMUSCULAR; INTRAVENOUS; SUBCUTANEOUS at 16:23

## 2022-07-12 RX ADMIN — METHOCARBAMOL 500 MG: 500 TABLET ORAL at 15:39

## 2022-07-12 RX ADMIN — HYDROMORPHONE HYDROCHLORIDE 0.2 MG: 1 INJECTION, SOLUTION INTRAMUSCULAR; INTRAVENOUS; SUBCUTANEOUS at 16:12

## 2022-07-12 RX ADMIN — PROPOFOL 150 MG: 10 INJECTION, EMULSION INTRAVENOUS at 11:45

## 2022-07-12 RX ADMIN — CELECOXIB 400 MG: 200 CAPSULE ORAL at 07:22

## 2022-07-12 RX ADMIN — FENTANYL CITRATE 25 MCG: 50 INJECTION, SOLUTION INTRAMUSCULAR; INTRAVENOUS at 15:18

## 2022-07-12 RX ADMIN — HYDROMORPHONE HYDROCHLORIDE 0.2 MG: 1 INJECTION, SOLUTION INTRAMUSCULAR; INTRAVENOUS; SUBCUTANEOUS at 15:38

## 2022-07-12 RX ADMIN — TRANEXAMIC ACID 1950 MG: 650 TABLET ORAL at 07:22

## 2022-07-12 RX ADMIN — Medication 2 G: at 11:53

## 2022-07-12 RX ADMIN — FENTANYL CITRATE 25 MCG: 50 INJECTION, SOLUTION INTRAMUSCULAR; INTRAVENOUS at 15:02

## 2022-07-12 RX ADMIN — FENTANYL CITRATE 25 MCG: 50 INJECTION, SOLUTION INTRAMUSCULAR; INTRAVENOUS at 12:22

## 2022-07-12 RX ADMIN — SODIUM CHLORIDE, POTASSIUM CHLORIDE, SODIUM LACTATE AND CALCIUM CHLORIDE: 600; 310; 30; 20 INJECTION, SOLUTION INTRAVENOUS at 13:15

## 2022-07-12 RX ADMIN — METOPROLOL TARTRATE 75 MG: 50 TABLET, FILM COATED ORAL at 21:08

## 2022-07-12 RX ADMIN — CEFAZOLIN SODIUM 2 G: 2 INJECTION, SOLUTION INTRAVENOUS at 19:59

## 2022-07-12 RX ADMIN — HYDROMORPHONE HYDROCHLORIDE 0.2 MG: 1 INJECTION, SOLUTION INTRAMUSCULAR; INTRAVENOUS; SUBCUTANEOUS at 16:07

## 2022-07-12 RX ADMIN — OXYCODONE HYDROCHLORIDE 10 MG: 5 TABLET ORAL at 21:33

## 2022-07-12 RX ADMIN — HYDROMORPHONE HYDROCHLORIDE 0.2 MG: 0.2 INJECTION, SOLUTION INTRAMUSCULAR; INTRAVENOUS; SUBCUTANEOUS at 18:22

## 2022-07-12 RX ADMIN — FENTANYL CITRATE 75 MCG: 50 INJECTION, SOLUTION INTRAMUSCULAR; INTRAVENOUS at 11:45

## 2022-07-12 RX ADMIN — ATORVASTATIN CALCIUM 80 MG: 80 TABLET, FILM COATED ORAL at 21:08

## 2022-07-12 RX ADMIN — SUCCINYLCHOLINE CHLORIDE 60 MG: 20 INJECTION, SOLUTION INTRAMUSCULAR; INTRAVENOUS; PARENTERAL at 11:45

## 2022-07-12 RX ADMIN — ACETAMINOPHEN 975 MG: 325 TABLET, FILM COATED ORAL at 15:39

## 2022-07-12 RX ADMIN — DEXAMETHASONE SODIUM PHOSPHATE 4 MG: 4 INJECTION, SOLUTION INTRAMUSCULAR; INTRAVENOUS at 12:04

## 2022-07-12 RX ADMIN — PROPOFOL 50 MCG/KG/MIN: 10 INJECTION, EMULSION INTRAVENOUS at 11:50

## 2022-07-12 RX ADMIN — HYDROXYZINE HYDROCHLORIDE 10 MG: 10 TABLET ORAL at 16:39

## 2022-07-12 RX ADMIN — FENTANYL CITRATE 25 MCG: 50 INJECTION, SOLUTION INTRAMUSCULAR; INTRAVENOUS at 15:11

## 2022-07-12 RX ADMIN — Medication 2 G: at 11:17

## 2022-07-12 RX ADMIN — FENTANYL CITRATE 25 MCG: 50 INJECTION, SOLUTION INTRAMUSCULAR; INTRAVENOUS at 14:56

## 2022-07-12 RX ADMIN — ACETAMINOPHEN 975 MG: 325 TABLET, FILM COATED ORAL at 23:05

## 2022-07-12 RX ADMIN — SODIUM CHLORIDE, POTASSIUM CHLORIDE, SODIUM LACTATE AND CALCIUM CHLORIDE: 600; 310; 30; 20 INJECTION, SOLUTION INTRAVENOUS at 11:00

## 2022-07-12 RX ADMIN — SENNOSIDES AND DOCUSATE SODIUM 1 TABLET: 50; 8.6 TABLET ORAL at 20:00

## 2022-07-12 RX ADMIN — LIDOCAINE HYDROCHLORIDE 80 MG: 20 INJECTION, SOLUTION INFILTRATION; PERINEURAL at 11:45

## 2022-07-12 ASSESSMENT — ACTIVITIES OF DAILY LIVING (ADL)
ADLS_ACUITY_SCORE: 22
ADLS_ACUITY_SCORE: 22
ADLS_ACUITY_SCORE: 20
ADLS_ACUITY_SCORE: 22
ADLS_ACUITY_SCORE: 28
ADLS_ACUITY_SCORE: 22
ADLS_ACUITY_SCORE: 28
ADLS_ACUITY_SCORE: 22

## 2022-07-12 ASSESSMENT — LIFESTYLE VARIABLES: TOBACCO_USE: 0

## 2022-07-12 NOTE — INTERVAL H&P NOTE
"I have reviewed the surgical (or preoperative) H&P that is linked to this encounter, and examined the patient. There are no significant changes    Clinical Conditions Present on Arrival:  Clinically Significant Risk Factors Present on Admission                   # Severe Obesity: Estimated body mass index is 47.99 kg/m  as calculated from the following:    Height as of this encounter: 1.57 m (5' 1.81\").    Weight as of this encounter: 118.3 kg (260 lb 12.9 oz).       "

## 2022-07-12 NOTE — ANESTHESIA PROCEDURE NOTES
Airway         Procedure Start/Stop Times: 7/12/2022 11:48 AM  Staff -        CRNA: Mulvihill, Ashley M, APRN CRNA       Performed By: CRNAIndications and Patient Condition       Indications for airway management: amanda-procedural       Induction type:intravenous       Mask difficulty assessment: 0 - not attempted    Final Airway Details       Final airway type: endotracheal airway       Successful airway: ETT - single and Oral  Endotracheal Airway Details        ETT size (mm): 6.5       Cuffed: yes       Successful intubation technique: direct laryngoscopy       DL Blade Type: MAC 3       Grade View of Cords: 2       Adjucts: stylet       Position: Right       Measured from: lips       Secured at (cm): 22       Bite block used: Oral Airway    Post intubation assessment        Placement verified by: capnometry, equal breath sounds and chest rise        Number of attempts at approach: 1       Number of other approaches attempted: 0       Secured with: commercial tube mack and silk tape       Ease of procedure: easy       Dentition: Intact and Unchanged    Medication(s) Administered   Medication Administration Time: 7/12/2022 11:48 AM

## 2022-07-12 NOTE — ANESTHESIA CARE TRANSFER NOTE
Patient: Jluis Duke    Procedure: Procedure(s):  Revision Left total knee arthroplasty       Diagnosis: Chronic pain of left knee [M25.562, G89.29]  Diagnosis Additional Information: No value filed.    Anesthesia Type:   Spinal     Note:    Oropharynx: oropharynx clear of all foreign objects  Level of Consciousness: awake  Oxygen Supplementation: face mask  Level of Supplemental Oxygen (L/min / FiO2): 8  Independent Airway: airway patency satisfactory and stable  Dentition: dentition unchanged  Vital Signs Stable: post-procedure vital signs reviewed and stable  Report to RN Given: handoff report given  Patient transferred to: PACU    Handoff Report: Identifed the Patient, Identified the Reponsible Provider, Reviewed the pertinent medical history, Discussed the surgical course, Reviewed Intra-OP anesthesia mangement and issues during anesthesia, Set expectations for post-procedure period and Allowed opportunity for questions and acknowledgement of understanding      Vitals:  Vitals Value Taken Time   /115 07/12/22 1445   Temp 36.6c    Pulse 87 07/12/22 1450   Resp 19 07/12/22 1450   SpO2 97 % 07/12/22 1450   Vitals shown include unvalidated device data.    Electronically Signed By: LANDON Lowe CRNA  July 12, 2022  2:51 PM

## 2022-07-12 NOTE — CONSULTS
[facility]  Consult Note - Hospitalist Service, GOLD TEAM   Date of Admission:  7/12/2022  Consult Requested by: Fabiano Solorio MD  Reason for Consult: Postoperative co-management     Assessment & Plan   Jluis Duke is a 71 year old female admitted on 7/12/2022. She has a known h/o Hypertension, MELVINA, diabetes, obesity, RLS and OA. She has a history of bilateral TKA in 2008. She was doing well until about 1 year ago when she developed left knee discomfort  She underwent revision of her left TKA today ( 7/12).   Internal medicine consulted for postoperative co-management  Individual problems and their management are outlined below      S/P Revision TKA, POD#0:  Management by primary team. Please see their notes for further details  Continue IV fluids until able to take oral diet   Pain control with acetaminophen 975 mg every 8 hrs for 3 days  Oxycodone 5-10 mg every 3 hrs PRN and IV hydromorphone 0.2-0.4mg q 2 hrs  PRN for breakthrough pain   DVT prophylaxis with  SCDs while in hospital and aspirin 162 mg  Perioperative antibiotics as per primary team   Overnight Capnography monitoring  PT/OT as per protocol  Karuna to come out on POD#1 unless clinically indicated to remain.  Incentive spirometry and aggressive bowel regimen (This has been ordered)  We will monitor for acute blood loss anemia. Pre op Hgb at 15.3     MELVINA:  Although patient uses a CPAP at home, she did not bring it in on purpose.  She wants a day off from CPAP machine. Says that she will sit up to sleep and will not require a CPAP     DM Type 2, A1c was 5.7 6/27/22.   Resume Jardiance 10 mg am   Resume Metformin 500 mg with dinner from tomorrow  BID blood sugars     HTN:  Hold Micardis hematocrit in the morning pending BMP  Resume metoprolol 50 mg BID with hold parameters       Dyslipidemia:   Resume Lipitor 80 mg and Zetia 10 mg        # Severe Obesity: Estimated body mass index is 47.99 kg/m  as  "calculated from the following:    Height as of this encounter: 1.57 m (5' 1.81\").    Weight as of this encounter: 118.3 kg (260 lb 12.9 oz).    Defer outpatient management for thius        The patient's care was discussed with the Bedside Nurse and Patient.    Caitlyn Jackson MD  [facility]  Securely message with the Vocera Web Console (learn more here)  Text page via [facility] Paging/Directory   Please see signed in provider for up to date coverage information    Hospitalist Service, GOLD TEAM     Clinically Significant Risk Factors Present on Admission                 # Hypertension: home medication list includes antihypertensive(s)    # Severe Obesity: Estimated body mass index is 47.99 kg/m  as calculated from the following:    Height as of this encounter: 1.57 m (5' 1.81\").    Weight as of this encounter: 118.3 kg (260 lb 12.9 oz).        ______________________________________________________________________    Chief Complaint   S/P Revision left total knee arthroplasty     History is obtained from the patient, pre op     History of Present Illness   Jluis Duke is a 71 year old female who underwent the above procedure today.  The procedure was initially tried under spinal anesthesia, failed and had to transition to general anesthesia.  Estimated blood loss was 100 mL.  Patient received about 1500 mL of lactated Ringer solution as perioperative fluids.  Postoperatively patient recovered well.    I met patient up on floor 8 Ortho, where she was resting comfortably.  She denies any chest pain or shortness of breath.  She denies any fever or chills.  She denies any nausea, vomiting or diarrhea.  She was still experiencing some tingling and numbness in her left lower extremity.  Her son was present at bedside.  All her medications were reviewed and reconciled.  She is in a pleasant mood.      , LR 1500, Spinal   Review of Systems   The 10 point " Review of Systems is negative other than noted in the HPI or here.     Past Medical History    I have reviewed this patient's medical history and updated it with pertinent information if needed.   Past Medical History:   Diagnosis Date     Diabetes (H)      Hypertension      Sleep apnea        Past Surgical History   I have reviewed this patient's surgical history and updated it with pertinent information if needed.  Past Surgical History:   Procedure Laterality Date     CHOLECYSTECTOMY       COLONOSCOPY       ORTHOPEDIC SURGERY Bilateral     2008 bilateral knees     PHACOEMULSIFICATION WITH STANDARD INTRAOCULAR LENS IMPLANT Left 02/10/2021    Procedure: Cataract removal with implant.;  Surgeon: Loyd Marquez MD;  Location: [facility] OR     PHACOEMULSIFICATION WITH STANDARD INTRAOCULAR LENS IMPLANT Right 03/01/2021    Procedure: Cataract removal with implant.;  Surgeon: Loyd Marquez MD;  Location: [facility] OR     TUBAL LIGATION         Social History   I have reviewed this patient's social history and updated it with pertinent information if needed.  Social History     Tobacco Use     Smoking status: Never Smoker     Smokeless tobacco: Never Used   Substance Use Topics     Alcohol use: Not Currently     Drug use: Never       Family History     Family history reviewed and non contributory    Medications   I have reviewed this patient's current medications    Allergies   Allergies   Allergen Reactions     Codeine Other (See Comments)     Pasted out  Pasted out         Physical Exam   Vital Signs: Temp: (!) 95.5  F (35.3  C) Temp src: Oral BP: (!) 145/69 Pulse: 85   Resp: 16 SpO2: 96 % O2 Device: Nasal cannula Oxygen Delivery: 2 LPM  Weight: 260 lbs 12.87 oz    Constitutional: awake, alert, cooperative, no apparent distress, and appears stated age  Eyes: Lids and lashes normal, pupils equal, round and reactive to light, extra ocular muscles intact, sclera clear, conjunctiva normal  ENT: Normocephalic, without  obvious abnormality, atraumatic, sinuses nontender on palpation, external ears without lesions, oral pharynx with moist mucous membranes.  Respiratory: No increased work of breathing, good air exchange, clear to auscultation bilaterally, no crackles or wheezing  Cardiovascular: Normal apical impulse, regular rate and rhythm, normal S1 and S2, no S3 or S4, and no murmur noted  GI: No scars, normal bowel sounds, soft, non-distended, non-tender, no masses palpated, no hepatosplenomegally  Skin: no bruising or bleeding  Musculoskeletal: Left knee with dressings intact. Left sided tingling and numbness. Bilateral lower extremity swelling noted   Neurologic: Awake, alert, oriented to name, place and time.  Cranial nerves II-XII are grossly intact.    Data   Results for orders placed or performed during the hospital encounter of 07/12/22 (from the past 24 hour(s))   Glucose by meter   Result Value Ref Range    GLUCOSE BY METER POCT 118 (H) 70 - 99 mg/dL   ABO/Rh type and screen    Narrative    The following orders were created for panel order ABO/Rh type and screen.  Procedure                               Abnormality         Status                     ---------                               -----------         ------                     Adult Type and Screen[[id number]]                            Final result                 Please view results for these tests on the individual orders.   Adult Type and Screen   Result Value Ref Range    ABO/RH(D) B POS     Antibody Screen Negative Negative    SPECIMEN EXPIRATION DATE [id number]    Glucose by meter   Result Value Ref Range    GLUCOSE BY METER POCT 125 (H) 70 - 99 mg/dL   XR Knee Port Left 1/2 Views    Narrative    Exam: 2 views of the left knee dated 7/12/2022.    COMPARISON: 5/23/2022.    CLINICAL HISTORY: Postop evaluation.    FINDINGS: AP and crosstable lateral view of the left knee were  obtained. Postsurgical changes of a revision left total knee  arthroplasty with  a longstem tibial component. Surgical hardware  appears intact. Postoperative air is noted.      Impression    IMPRESSION: Revision left total knee arthroplasty with longstem tibial  component.    MARTHA MICHELE MD         SYSTEM ID:  [id number]

## 2022-07-12 NOTE — ANESTHESIA PREPROCEDURE EVALUATION
Pre-Operative H & P     CC:  Preoperative exam to assess for increased cardiopulmonary risk while undergoing surgery and anesthesia.    Date of Encounter: 7/11/2022  Primary Care Physician:  Olivia Aviles     Reason for visit:   No diagnosis found.    HPI  Jluis Duke is a 71 year old female who presents for pre-operative H & P in preparation for  Procedure Information     Case: [id number] Date/Time: 07/12/22 1000    Procedure: Revision Left total knee arthroplasty (Left Knee)    Anesthesia type: General    Diagnosis: Chronic pain of left knee [M25.562, G89.29]    Pre-op diagnosis: Chronic pain of left knee [M25.562, G89.29]    [facility]: [facility]    Providers: Minesh Mayo MD          Patient is being evaluated for comorbid conditions of Hypertension, MELVINA, diabetes, obesity, RLS    Ms. Duke has a history of bilateral TKA in 2008. She was doing well until about 1 year ago when she developed left knee discomfort. This pain has been progressively worsening since that time. She was seen by Dr. Mayo for further evaluation and is now scheduled for the above procedure.     History is obtained from the patient and chart review    Hx of abnormal bleeding or anti-platelet use: ASA    menstrual history: No LMP recorded. Patient is postmenopausal.     Past Medical History  Past Medical History:   Diagnosis Date     Diabetes (H)      Hypertension      Sleep apnea        Past Surgical History  Past Surgical History:   Procedure Laterality Date     CHOLECYSTECTOMY       COLONOSCOPY       ORTHOPEDIC SURGERY Bilateral     2008 bilateral knees     PHACOEMULSIFICATION WITH STANDARD INTRAOCULAR LENS IMPLANT Left 02/10/2021    Procedure: Cataract removal with implant.;  Surgeon: Loyd Marquez MD;  Location: [facility]     PHACOEMULSIFICATION WITH STANDARD INTRAOCULAR LENS IMPLANT Right 03/01/2021    Procedure: Cataract removal with implant.;  Surgeon: Loyd Marquez MD;  Location: [facility] OR     TUBAL  LIGATION         Prior to Admission Medications  No current outpatient medications on file.       Allergies  Allergies   Allergen Reactions     Codeine Other (See Comments)     Pasted out  Pasted out         Social History  Social History     Socioeconomic History     Marital status: Single     Spouse name: Not on file     Number of children: Not on file     Years of education: Not on file     Highest education level: Not on file   Occupational History     Not on file   Tobacco Use     Smoking status: Never Smoker     Smokeless tobacco: Never Used   Substance and Sexual Activity     Alcohol use: Not Currently     Drug use: Never     Sexual activity: Not Currently   Other Topics Concern     Parent/sibling w/ CABG, MI or angioplasty before 65F 55M? Not Asked   Social History Narrative     Not on file     Social Determinants of Health     Financial Resource Strain: Not on file   Food Insecurity: Not on file   Transportation Needs: Not on file   Physical Activity: Not on file   Stress: Not on file   Social Connections: Not on file   Intimate Partner Violence: Not on file   Housing Stability: Not on file       Family History  Family History   Problem Relation Age of Onset     Anesthesia Reaction No family hx of      Deep Vein Thrombosis (DVT) No family hx of        Review of Systems  The complete review of systems is negative other than noted in the HPI or here.   Anesthesia Evaluation   Pt has had prior anesthetic.         ROS/MED HX  ENT/Pulmonary:     (+) sleep apnea, uses CPAP,  (-) tobacco use   Neurologic: Comment: RLS      Cardiovascular:     (+) hypertension-----Taking blood thinners Previous cardiac testing   Echo: Date: 3/2021 Results:  Final Conclusion    Visually Estimated EF: 60-65%    Technically difficult study - contrast was used to enhance endocardial definition due to   suboptimal image quality.    Normal LV size and systolic function.    Mitral annular calcification resulting in a mean mitral  transvalvular gradient is 4 mmHg at 80   bpm.    Estimated pulmonary artery pressure of 21 mmHg + RA pressure.    Normal IVC size.     Stress Test: Date: 3/16/21 Results:  FINAL CONCLUSIONS   1. Myocardial perfusion imaging is NORMAL.   2. The stress ECG was negative for ischemia   3. Overall left ventricular systolic function was normal without wall   motion abnormalities. The Left Ventricle Ejection Fraction was calculated   to be 58% (>55% is considered normal).   4. Calcium Score Total: 222.3      LMain: 0.0       LAD: 223.3       LCX:   0.0       RCA: 0.0       SANFORD: 79    (Reference norms based on patient   ages of 45-84: Minimal Calcium: 1-10; Mild Calcium: ; Moderate   Calcium: 101-400; High Calcium: 401 and above).   5. Please see radiology report for non-cardiac findings.       ECG Reviewed:  Date: 6/2022 Results:  SR, low voltage QRS, can't r/o anterior infarct  Cath:  Date: Results:      METS/Exercise Tolerance: 1 - Eating, dressing Comment: limited due to knee pain. Prior to knee pain, was riding stationary bike and regular walks last summer.    Hematologic:  - neg hematologic  ROS  (-) history of blood clots and history of blood transfusion   Musculoskeletal:   (+) arthritis,     GI/Hepatic:  - neg GI/hepatic ROS  (-) GERD   Renal/Genitourinary:  - neg Renal ROS     Endo:     (+) type II DM, Last HgA1c: 5.7, date: 6/27/22, Not using insulin, Obesity (BMI 45),     Psychiatric/Substance Use:  - neg psychiatric ROS  (-) psychiatric history   Infectious Disease:  - neg infectious disease ROS     Malignancy:  - neg malignancy ROS     Other:            Virtual visit -  No vitals were obtained    Physical Exam  Constitutional: Awake, alert, cooperative, no apparent distress, and appears stated age.  HENT: Normocephalic  Respiratory: non labored breathing   Neurologic: Awake, alert, oriented to name, place and time.   Neuropsychiatric: Calm, cooperative. Normal affect.      Prior Labs/Diagnostic Studies  "  All labs and imaging personally reviewed     EKG/ stress test - if available please see in ROS above   No results found.  No flowsheet data found.      The patient's records and results personally reviewed by this provider.     Outside records reviewed from: Care Everywhere      Assessment      Jluis Duke is a 71 year old female seen as a PAC referral for risk assessment and optimization for anesthesia.    Plan/Recommendations  Pt will be optimized for the proposed procedure.  See below for details on the assessment, risk, and preoperative recommendations    NEUROLOGY  - No history of TIA, CVA or seizure  -Post Op delirium risk factors:  No risk identified    ENT  - No current airway concerns.  Will need to be reassessed day of surgery.  Mallampati: Unable to assess  TM: Unable to assess    CARDIAC  - No history of Afib  - Hypertension  -patient denies cardiac symptoms. Limited activity secondary to knee pain over past year. See previous cardiac testing above.   - METS (Metabolic Equivalents)  Patient CANNOT perform 4 METS exercise without symptoms            Total Score: 1    Functional Capacity: Unable to complete 4 METS      RCRI-Very low risk: Class 1 0.4% complication rate            Total Score: 0        PULMONARY  - Obstructive Sleep Apnea  MELVINA with home CPAP.  Patient will be instructed to bring their home CPAP device to the hospital with them.  - Denies asthma or inhaler use  - Tobacco History      History   Smoking Status     Never Smoker   Smokeless Tobacco     Never Used       GI  PONV High Risk  Total Score: 3           1 AN PONV: Pt is Female    1 AN PONV: Patient is not a current smoker    1 AN PONV: Intended Post Op Opioids        /RENAL  - Baseline Creatinine  WNL    ENDOCRINE    - BMI: Estimated body mass index is 47.99 kg/m  as calculated from the following:    Height as of an earlier encounter on 7/12/22: 1.57 m (5' 1.81\").    Weight as of an earlier encounter on 7/12/22: 118.3 kg (260 " lb 12.9 oz).  Class 3 Obesity (BMI > 40)  - Diabetes  Diabetes Mellitus, Type 2, non-insulin dependent.  Hold morning oral hypoglycemic medications. Recommend close monitoring of the patient's blood glucose levels throughout the perioperative period. A1c was 5.7 6/27/22.     HEME  VTE Low Risk 0.5%            Total Score: 2    VTE: Greater than 59 yrs old    VTE: BMI greater than 39      - Platelet disfunction second to Aspirin (Jani, many others)  -as this is a virtual visit and surgery is tomorrow morning, will order T&S for preop DOS    MSK  -OA with above procedure planned     The patient is optimized for their procedure. AVS with information on surgery time/arrival time, meds and NPO status given by nursing staff. No further diagnostic testing indicated.    Please refer to the physical examination documented by the anesthesiologist in the anesthesia record on the day of surgery.    Video-Visit Details    Type of service:  Video Visit    Patient verbally consented to video service today: YES    Video Start Time: 01575  Video End Time (time video stopped): 1553    Originating Location (pt. Location): Home    Distant Location (provider location): home    Mode of Communication:  Video Conference via Doximity- changed to telephone due to technical difficulty   On the day of service:     Prep time: 11 minutes  Visit time: 14 minutes  Documentation time: 8 minutes  ------------------------------------------  Total time: 33 minutes      Bita Jacobo PA-C  Preoperative Assessment Center  [facility]  Phone: [phone]  Fax: [phone]    Physical Exam    Airway        Mallampati: II   TM distance: > 3 FB   Neck ROM: full   Mouth opening: > 3 cm    Respiratory Devices and Support         Dental  no notable dental history         Cardiovascular   cardiovascular exam normal          Pulmonary   pulmonary exam normal            Other findings: Obese      Anesthesia  Plan    ASA Status:  3      Anesthesia Type: Spinal.              Consents    Anesthesia Plan(s) and associated risks, benefits, and realistic alternatives discussed. Questions answered and patient/representative(s) expressed understanding.    - Discussed:     - Discussed with:  Patient    Use of blood products discussed: Yes.     - Consented: consented to blood products            Reason for refusal: other.     Postoperative Care    Pain management: Multi-modal analgesia.   PONV prophylaxis: Ondansetron (or other 5HT-3)     Comments:

## 2022-07-12 NOTE — OR NURSING
Writing nurse and orientee, Corina KESSLER took over care at 1530. Report given back to Desiree ROBLEDO at 1600.

## 2022-07-12 NOTE — OP NOTE
Orthopaedic Surgery Op-Note      Patient: Jluis Duke  : 1950  Date of Service: 2022 2:44 PM    Pre-operative Diagnosis: Chronic pain of left knee [M25.562, G89.29]  Post-operative Diagnosis: same    Procedure(s) Performed: Procedure(s):  Revision Left total knee arthroplasty    Staff: Dr. Mayo  Assistants:   MD Fabiano Emerson PA-C    Anesthesia: General  EBL: 100 cc  UOP: see anesthesia record  Tourniquet Time: 120 minutes at 250 mmHg    Implants:   Implant Name Type Inv. Item Serial No.  Lot No. LRB No. Used Action   BONE CEMENT SIMPLEX W/TOBRAMYCIN 6197-9-001 - [id number] Cement, Bone BONE CEMENT SIMPLEX W/TOBRAMYCIN 6197-9-001  [employer] [id number] Left 1 Implanted   BONE CEMENT SIMPLEX W/TOBRAMYCIN 6197-9-001 - [id number] Cement, Bone BONE CEMENT SIMPLEX W/TOBRAMYCIN 6197-9-001  [employer] [id number] Left 1 Implanted   IMP STEM TIBIAL ZIM NEXGEN SIZE 4 -726-00 - [id number] Total Joint Component/Insert IMP STEM TIBIAL ZIM NEXGEN SIZE 4 -648-00  [employer] [id number] Left 1 Implanted   IMP STEM KNEE NEXGEN [id number] -770-11 - [id number] Total Joint Component/Insert IMP STEM KNEE NEXGEN [id number] -222-11  [employer] [id number] Left 1 Implanted   preexisting tibia and poly explanted from left TKA Total Joint Component/Insert     Left 2 Explanted   IMP ART SURFACE ZIM LPS FLEX EF 3-4 17MM -499-17 - [id number] Total Joint Component/Insert IMP ART SURFACE ZIM LPS FLEX EF 3-4 17MM -396-17  [employer] [id number] Left 1 Implanted     Drains: None  Intra-op Labs/Cxs/Specimens:   ID Type Source Tests Collected by Time Destination   A : Left knee #1 Tissue Knee, Left ANAEROBIC BACTERIAL CULTURE ROUTINE, AEROBIC BACTERIAL CULTURE ROUTINE Minesh Mayo MD 2022  1:21 PM    B : Left knee #2 Tissue Knee, Left ANAEROBIC BACTERIAL CULTURE ROUTINE, AEROBIC BACTERIAL CULTURE ROUTINE Minesh Mayo MD 2022  1:21  PM      Complications: No apparent complications during procedure  Findings: Extensive diffuse synovitis throughout the entire knee.  No evidence of purulence upon entrance of the joint capsule.  The femoral component was well fixed and retained.  The patellar component was well fixed and retained.  The tibial component was grossly loose.  This was disimpacted and removed from the knee without complication and minimal residual bone loss.  Fibrous tissue at the implant cement interface was debrided, and residual cement on the proximal tibia was removed with osteotomes, and a Midas bur.  There was a small degree of metaphyseal bone loss, all contained.    Disposition: Stable to PACU, then admit to Orthopaedics.    OPERATIVE PROCEDURE:   The patient was seen in the pre-operative area; informed consent was obtained.  The surgical site was appropriately marked by the patient and the operating surgeon.  The patient was brought to the operating room and transferred to the OR bed. The patient was positioned with appropriate padding and a safety strap. The patient's operative extremity was prepped and draped in the standard sterile fashion.  At this time a surgical safety timeout was performed involving the operating room nurse, anesthesia team, scrub tech, and operating surgeon.  The left lower extremity was then exsanguinated and the tourniquet inflated to 250 mmHg.    The previously utilized midline incision over the anterior aspect of the knee was marked.  A skin knife was used to perform incision in line with the scar.  This was extended slightly proximally and distally.  Bovie electrocautery was used to extend and dissect through subcutaneous tissue down the level of the joint capsule.  A medial parapatellar arthrotomy was performed.  Partial medial release was made on the proximal aspect of the tibia.  Upon entrance to the synovial cavity there was straw-colored synovial fluid and abundant and extensive synovitis  within the joint.  The suprapatellar, medial and lateral gutters were debrided and specimens were obtained for anaerobic and aerobic culture analysis.    Attention was then directed towards the polyethylene liner this was removed with a Haywood and a mallet without difficulty.  We then inspected the femoral component, the bone implant interface was demarcated with a rongeur and stability was tested with an offset drift and mallet.  It was found to be well fixed and we elected to retain the femoral component.  We then directed our attention towards the tibial baseplate.  The knee was brought into flexion and appropriate retractors were placed around the proximal aspect of the tibia.  There is evidence of gross micromotion of subsidence with the tibial tray.  Utilizing a bone hook the tibial tray was easily extracted without residual bone loss.  There is a contained medullary defect within the proximal aspect of the tibia measuring approximately 5 mm x 5 mm on the medial aspect of the plateau.  1/4 inch osteotome and rongeur were utilized to remove any retained cement within the proximal tibia.  This was removed without complication.  Care was taken to avoid iatrogenic fracture of the proximal tibia.  Curettes were then used to debride any fibrinous tissue noted interfere with implant fixation.  A tissue samples taken from the proximal aspect of the tibia after removal of the implant for aerobic and anaerobic cultural analysis.  With an intramedullary drill, the medullary canal was open and then reamed in sequential fashion up to an 11 mm diameter reamer.  Reaming was carried to 100 mm depth corresponding to an 11 x 100 mm tibial stem.  The intramedullary reamer was left in the medullary canal and jig was placed to make a refresher cut on the proximal aspect of the tibia.  A trial tibial tray was assessed for proper fit and sizing, the size 3 tibial tray was first utilized and found to be slightly small with residual  bone extending medially, laterally and posteriorly.  We upsized to a size 4 tibial tray which looked appropriate.  A trial size 4 tibial tray with a 100 x 11 mm stem was assembled on the back table and placed on the proximal aspect of the tibia.  It was impacted into place.    We then trialed with a Stephanie LPS liner, size 12 mm.  This was found to be loose in the coronal and sagittal planes both in extension, and mid flexion.  We elected to upsize the trial liner to a size 17.  This was inserted and the knee was found to reach terminal extension, stable to varus valgus stress testing.  Mid flexion the knee was stable in the coronal and sagittal planes, and the knee was stable full flexion without any evidence of polyp patella impingement for elevation of the tray.  Satisfied with these trial components, we chose these as our final implants.  The trials were removed from the knee and with pulsatile lavage the proximal aspect of the tibia was thoroughly irrigated, cleaned and dried in order to prepare for cementation.    While the proximal tibia was prepared the implant was assembled on the back table.  Clean gloves were donned by the surgeon and a size 4 Stephanie NexGen tibial placed plate was selected with a 11 x 100 mm stem.  When the cement became doughy, the implant was cemented into the proximal aspect of the tibia.  Any excess cement was removed with for years and applied to the right Ray-Stephani from the components.  Adequate time was given for cement set.  We then selected the final LPS 17 mm polyethylene liner which was placed and secured to the tibial baseplate with a screw.  He was reduced stability was the same as trial stability with terminal extension to 120 degrees of flexion stable throughout extension, mid flexion and full flexion in coronal and sagittal planes.    The wound is then thoroughly irrigated with sterile saline on pulsatile lavage.  Wound closure was performed in layers, with the capsular layer  closed in figure-of-eight fashion with 0 Vicryl sutures.  Deep cutaneous and fat layers beginning with 0-vicryl sutures in figure of eight.  Closure continued with 2-0 vicryl buried interrupted sutures for the subcutaneous tissue and a running subcuticular 3-0 PDS. Sterile dressing were applied. The patient was transferred to the hospital bed and brought to PACU in stable condition.    Post-Op Plan:  Assessment/Plan: Jluis Duke is a 71 year old female s/p Procedure(s):  Revision Left total knee arthroplasty on 7/12/2022 with Dr. Mayo.    Activity: Up with assist and assistive devices as needed until independent. Knee ROM as tolerated. Advance CPM as tolerated to goal of 0 to 90 degrees.  Weight bearing status: WBAT    Antibiotics: Cefazolin until intraoperative cultures have resulted , 14 day course extended antibiotics on discharge  Diet: Begin with clear fluids and progress diet as tolerated. Bowel regimen. Anti-emetics PRN.    DVT prophylaxis: Aspirin 162mg daily x 4 weeks, starting morning of POD 1  Elevation: Elevate heels off of bed on pillows, no pillows behind the knee at any time    Wound Care: Dressing change POD 7  Drains: None  Beckman: None  Pain management: Orals PRN, IV for breakthrough only  X-rays: AP/Lat knee XR in PACU  Physical Therapy: Mobilization, ROM, ADL's  Occupational Therapy: ADL's  Labs: Trend Hgb on PODs #1 & 2  Cultures: None  Consults: PT, OT. Hospitalist, appreciate assistance in caring for this patient throughout the perioperative period    Future Appointments   Date Time Provider Department Center   7/13/2022  8:15 AM Nicci Sanches, PT [facility] [place]   7/13/2022 10:15 AM Angelique Arnold, CASSIDY [facility] [place]   7/13/2022  2:00 PM Nicci Sanches, PT [facility] [place]   8/8/2022  2:00 PM Minesh Mayo MD [facility]       Disposition: Pending progress with therapies, pain control on orals, and medical stability, anticipate discharge to Home vs TCU on POD #2-3    Amarjit Morales,   7/12/2022 2:44 PM  Orthopaedic Surgery     Attending MD (Dr. Minesh Mayo) Attestation:  I was present during the key portions of the procedure and I was immediately available for the entire procedure between opening and closing.    Minesh Mayo MD  [facility] Family Professor  Oncology and Adult Reconstructive Surgery  Dept Orthopaedic Surgery, [facility]

## 2022-07-12 NOTE — BRIEF OP NOTE
Orthopaedic Surgery Brief Op-Note      Patient: Jluis Duke  : 1950  Date of Service: 2022 3:18 PM    Pre-operative Diagnosis: Chronic pain of left knee [M25.562, G89.29]  Post-operative Diagnosis: same    Procedure(s) Performed: Procedure(s):  Revision Left total knee arthroplasty    Staff: Dr. Mayo  Assistants:   Fabiano Solorio PA-C    Anesthesia: General  EBL: 100 cc  UOP: see anesthesia record  Tourniquet Time: 120 minutes at 250 mmHg    Implants:   Implant Name Type Inv. Item Serial No.  Lot No. LRB No. Used Action   BONE CEMENT SIMPLEX W/TOBRAMYCIN 6197-9-001 - [id number] Cement, Bone BONE CEMENT SIMPLEX W/TOBRAMYCIN 6197-9-001  [employer] [id number] Left 1 Implanted   BONE CEMENT SIMPLEX W/TOBRAMYCIN 6197-9-001 - [id number] Cement, Bone BONE CEMENT SIMPLEX W/TOBRAMYCIN 6197-9-001  [employer] [id number] Left 1 Implanted   IMP STEM TIBIAL ZIM NEXGEN SIZE 4 -029-00 - [id number] Total Joint Component/Insert IMP STEM TIBIAL ZIM NEXGEN SIZE 4 -104-00  [employer] [id number] Left 1 Implanted   IMP STEM KNEE NEXGEN [id number] -700-11 - [id number] Total Joint Component/Insert IMP STEM KNEE NEXGEN [id number] -875-11  [employer] [id number] Left 1 Implanted   preexisting tibia and poly explanted from left TKA Total Joint Component/Insert     Left 2 Explanted   IMP ART SURFACE ZIM LPS FLEX EF 3-4 17MM -293-17 - [id number] Total Joint Component/Insert IMP ART SURFACE ZIM LPS FLEX EF 3-4 17MM -038-17  [employer] [id number] Left 1 Implanted     Drains: none  Intra-op Labs/Cxs/Specimens:   ID Type Source Tests Collected by Time Destination   A : Left knee #1 Tissue Knee, Left ANAEROBIC BACTERIAL CULTURE ROUTINE, AEROBIC BACTERIAL CULTURE ROUTINE Minesh Mayo MD 2022  1:21 PM    B : Left knee #2 Tissue Knee, Left ANAEROBIC BACTERIAL CULTURE ROUTINE, AEROBIC BACTERIAL CULTURE ROUTINE Minesh Mayo MD 2022  1:21 PM       Complications: No apparent complications during procedure  Findings: Please see dictated operative note for details    Disposition: Stable to PACU, then admit to Orthopaedics.    Post-Op Plan:  Assessment/Plan: Jluis Duke is a 71 year old female s/p Procedure(s):  Revision Left total knee arthroplasty on 7/12/2022 with Dr. Mayo.    Activity: Up with assist and assistive devices as needed until independent. Knee ROM as tolerated.   Weight bearing status: WBAT    Antibiotics: Ancef while inpatient then 14 days of Keflex 500mg QID at discharge   Diet: Begin with clear fluids and progress diet as tolerated. Bowel regimen. Anti-emetics PRN.    DVT prophylaxis: Aspirin 162mg daily x 4 weeks, starting morning of POD 1  Elevation: Elevate heels off of bed on pillows, no pillows behind the knee at any time    Wound Care: Alginate, tegaderm to be changed PRN by ortho PRN  Drains: none  Beckman: none  Pain management: Orals PRN, IV for breakthrough only  X-rays: AP/Lat knee XR in PACU  Physical Therapy: Mobilization, ROM, ADL's  Occupational Therapy: ADL's  Labs: Trend Hgb on PODs #1 & 2  Cultures: Pending  Consults: PT, OT. Hospitalist, appreciate assistance in caring for this patient throughout the perioperative period    Future Appointments   Date Time Provider Department Center   7/13/2022  8:15 AM Nicci Sanches PT [facility] [place]   7/13/2022 10:15 AM Angelique Arnold OT [facility]OT [place]   7/13/2022  2:00 PM Nicci Sanches PT URPT [place]   8/8/2022  2:00 PM Minesh Mayo MD [facility]       Disposition: Pending progress with therapies, pain control on orals, and medical stability, anticipate discharge to Home vs TCU on POD #1-2.  Follow up: Plan for follow up with Dr. Mayo 8/8/22    I assisted with positioning, prepping and draping, and closure.      Fabiano Solorio PA-C  7/12/2022 3:18 PM  Physician Assistant   Oncology and Adult Reconstructive Surgery  Dept Orthopaedic Surgery, [facility]      Thank you for allowing me to participate in this patient's care. Please page me directly any questions/concerns.   Securely message with the Vocera Web Console (learn more here)  Text page via [employer] Paging/[employer]    If there is no response, if it is a weekend, or if it is during evening hours, please page the orthopaedic surgery resident on call via [employer] Paging/Directory

## 2022-07-13 ENCOUNTER — APPOINTMENT (OUTPATIENT)
Dept: PHYSICAL THERAPY | Facility: CLINIC | Age: 72
DRG: 467 | End: 2022-07-13
Attending: ORTHOPAEDIC SURGERY
Payer: COMMERCIAL

## 2022-07-13 ENCOUNTER — DOCUMENTATION ONLY (OUTPATIENT)
Dept: MEDSURG UNIT | Facility: CLINIC | Age: 72
End: 2022-07-13

## 2022-07-13 VITALS
WEIGHT: 260.8 LBS | HEIGHT: 62 IN | SYSTOLIC BLOOD PRESSURE: 147 MMHG | OXYGEN SATURATION: 94 % | TEMPERATURE: 99.4 F | DIASTOLIC BLOOD PRESSURE: 72 MMHG | BODY MASS INDEX: 47.99 KG/M2 | RESPIRATION RATE: 14 BRPM | HEART RATE: 80 BPM

## 2022-07-13 LAB
ANION GAP SERPL CALCULATED.3IONS-SCNC: 6 MMOL/L (ref 3–14)
BUN SERPL-MCNC: 12 MG/DL (ref 7–30)
CALCIUM SERPL-MCNC: 8.8 MG/DL (ref 8.5–10.1)
CHLORIDE BLD-SCNC: 107 MMOL/L (ref 94–109)
CO2 SERPL-SCNC: 28 MMOL/L (ref 20–32)
CREAT SERPL-MCNC: 0.61 MG/DL (ref 0.52–1.04)
GFR SERPL CREATININE-BSD FRML MDRD: >90 ML/MIN/1.73M2
GLUCOSE BLD-MCNC: 142 MG/DL (ref 70–99)
HGB BLD-MCNC: 12.5 G/DL (ref 11.7–15.7)
POTASSIUM BLD-SCNC: 4.1 MMOL/L (ref 3.4–5.3)
SODIUM SERPL-SCNC: 141 MMOL/L (ref 133–144)

## 2022-07-13 PROCEDURE — 250N000011 HC RX IP 250 OP 636: Performed by: PHYSICIAN ASSISTANT

## 2022-07-13 PROCEDURE — 999N000111 HC STATISTIC OT IP EVAL DEFER

## 2022-07-13 PROCEDURE — 250N000013 HC RX MED GY IP 250 OP 250 PS 637: Performed by: STUDENT IN AN ORGANIZED HEALTH CARE EDUCATION/TRAINING PROGRAM

## 2022-07-13 PROCEDURE — 36415 COLL VENOUS BLD VENIPUNCTURE: CPT | Performed by: PHYSICIAN ASSISTANT

## 2022-07-13 PROCEDURE — 97110 THERAPEUTIC EXERCISES: CPT | Mod: GP

## 2022-07-13 PROCEDURE — 82310 ASSAY OF CALCIUM: CPT | Performed by: INTERNAL MEDICINE

## 2022-07-13 PROCEDURE — 97161 PT EVAL LOW COMPLEX 20 MIN: CPT | Mod: GP

## 2022-07-13 PROCEDURE — 36415 COLL VENOUS BLD VENIPUNCTURE: CPT | Performed by: INTERNAL MEDICINE

## 2022-07-13 PROCEDURE — 250N000013 HC RX MED GY IP 250 OP 250 PS 637: Performed by: PHYSICIAN ASSISTANT

## 2022-07-13 PROCEDURE — 99232 SBSQ HOSP IP/OBS MODERATE 35: CPT | Performed by: INTERNAL MEDICINE

## 2022-07-13 PROCEDURE — 85018 HEMOGLOBIN: CPT | Performed by: PHYSICIAN ASSISTANT

## 2022-07-13 PROCEDURE — 250N000013 HC RX MED GY IP 250 OP 250 PS 637: Performed by: INTERNAL MEDICINE

## 2022-07-13 PROCEDURE — 97116 GAIT TRAINING THERAPY: CPT | Mod: GP

## 2022-07-13 RX ORDER — HYDROMORPHONE HYDROCHLORIDE 2 MG/1
2 TABLET ORAL
Status: DISCONTINUED | OUTPATIENT
Start: 2022-07-13 | End: 2022-07-13 | Stop reason: HOSPADM

## 2022-07-13 RX ORDER — ACETAMINOPHEN 325 MG/1
650 TABLET ORAL EVERY 4 HOURS PRN
Qty: 60 TABLET | Refills: 0 | Status: SHIPPED | OUTPATIENT
Start: 2022-07-15 | End: 2022-07-29

## 2022-07-13 RX ORDER — HYDROMORPHONE HYDROCHLORIDE 2 MG/1
2-4 TABLET ORAL
Qty: 40 TABLET | Refills: 0 | Status: SHIPPED | OUTPATIENT
Start: 2022-07-13 | End: 2022-07-29

## 2022-07-13 RX ORDER — HYDROXYZINE HYDROCHLORIDE 10 MG/1
10 TABLET, FILM COATED ORAL EVERY 6 HOURS PRN
Qty: 40 TABLET | Refills: 0 | Status: SHIPPED | OUTPATIENT
Start: 2022-07-13 | End: 2022-07-29

## 2022-07-13 RX ORDER — POLYETHYLENE GLYCOL 3350 17 G/17G
17 POWDER, FOR SOLUTION ORAL DAILY
Qty: 10 PACKET | Refills: 0 | Status: SHIPPED | OUTPATIENT
Start: 2022-07-13

## 2022-07-13 RX ORDER — OXYCODONE HYDROCHLORIDE 5 MG/1
5-10 TABLET ORAL EVERY 4 HOURS PRN
Qty: 40 TABLET | Refills: 0 | Status: SHIPPED | OUTPATIENT
Start: 2022-07-13 | End: 2022-07-13

## 2022-07-13 RX ADMIN — METOPROLOL TARTRATE 75 MG: 50 TABLET, FILM COATED ORAL at 07:56

## 2022-07-13 RX ADMIN — ASPIRIN 162 MG: 81 TABLET, COATED ORAL at 07:57

## 2022-07-13 RX ADMIN — SENNOSIDES AND DOCUSATE SODIUM 1 TABLET: 50; 8.6 TABLET ORAL at 07:57

## 2022-07-13 RX ADMIN — HYDROMORPHONE HYDROCHLORIDE 2 MG: 2 TABLET ORAL at 12:04

## 2022-07-13 RX ADMIN — OXYCODONE HYDROCHLORIDE 5 MG: 5 TABLET ORAL at 04:46

## 2022-07-13 RX ADMIN — EMPAGLIFLOZIN 10 MG: 10 TABLET, FILM COATED ORAL at 07:57

## 2022-07-13 RX ADMIN — POLYETHYLENE GLYCOL 3350 17 G: 17 POWDER, FOR SOLUTION ORAL at 07:57

## 2022-07-13 RX ADMIN — CEFAZOLIN SODIUM 2 G: 2 INJECTION, SOLUTION INTRAVENOUS at 04:12

## 2022-07-13 RX ADMIN — HYDROMORPHONE HYDROCHLORIDE 2 MG: 2 TABLET ORAL at 07:58

## 2022-07-13 RX ADMIN — EZETIMIBE 10 MG: 10 TABLET ORAL at 07:57

## 2022-07-13 RX ADMIN — ACETAMINOPHEN 975 MG: 325 TABLET, FILM COATED ORAL at 07:56

## 2022-07-13 ASSESSMENT — ACTIVITIES OF DAILY LIVING (ADL)
ADLS_ACUITY_SCORE: 28

## 2022-07-13 NOTE — PLAN OF CARE
[facility]      OUTPATIENT PHYSICAL THERAPY EVALUATION  PLAN OF TREATMENT FOR OUTPATIENT REHABILITATION  (COMPLETE FOR INITIAL CLAIMS ONLY)  Patient's Last Name, First Name, M.I.  YOB: 1950  Jluis Duke                        Provider's Name  [facility] Medical Record No.  [id number]                               Onset Date:  07/12/22   Start of Care Date:         Type:     _X_PT   ___OT   ___SLP Medical Diagnosis:                           PT Diagnosis:  impaired functional mobility   Visits from SOC:  1   _________________________________________________________________________________  Plan of Treatment/Functional Goals    Planned Interventions: bed mobility training, gait training, home exercise program, ROM (range of motion), strengthening, transfer training, progressive activity/exercise, risk factor education, home program guidelines     Goals: See Physical Therapy Goals on Care Plan in [facility] electronic health record.    Therapy Frequency: One time eval and treatment only  Predicted Duration of Therapy Intervention: 07/13/22  _________________________________________________________________________________    I CERTIFY THE NEED FOR THESE SERVICES FURNISHED UNDER        THIS PLAN OF TREATMENT AND WHILE UNDER MY CARE     (Physician co-signature of this document indicates review and certification of the therapy plan).               ,      Referring Physician: Dr. Mayo            Initial Assessment        See Physical Therapy evaluation dated   in Epic electronic health record.

## 2022-07-13 NOTE — PLAN OF CARE
Physical Therapy Discharge Summary    Reason for therapy discharge:    All goals and outcomes met, no further needs identified.    Progress towards therapy goal(s). See goals on Care Plan in [facility] electronic health record for goal details.  Goals met    Therapy recommendation(s):    Continued therapy is recommended.  Rationale/Recommendations:  home PT for safety evaluation and progression.

## 2022-07-13 NOTE — PROGRESS NOTES
Patient shared she did not bring her home CPAP and does not want to borrow hospital CPAP machine during her stay here. Will let RT know if anything changes.

## 2022-07-13 NOTE — PROGRESS NOTES
07/13/22 0837   Quick Adds   Type of Visit Initial PT Evaluation       Present no   Language English   Living Environment   People in Home alone   Current Living Arrangements apartment  (senior apartment)   Home Accessibility no concerns   Transportation Anticipated family or friend will provide   Self-Care   Usual Activity Tolerance good   Current Activity Tolerance moderate   Regular Exercise No   Equipment Currently Used at Home walker, rolling   Fall history within last six months no   General Information   Onset of Illness/Injury or Date of Surgery 07/12/22   Referring Physician Fabiano Solorio, PALizandroC   Patient/Family Therapy Goals Statement (PT) Go home today   Weight-Bearing Status - LUE full weight-bearing   Weight-Bearing Status - RUE full weight-bearing   Weight-Bearing Status - LLE weight-bearing as tolerated   Weight-Bearing Status - RLE full weight-bearing   General Observations Supine in bed upon arrival, agreeable to PT   Cognition   Affect/Mental Status (Cognition) WNL   Orientation Status (Cognition) oriented x 3   Follows Commands (Cognition) WNL   Pain Assessment   Patient Currently in Pain Yes, see Vital Sign flowsheet   Integumentary/Edema   Integumentary/Edema Comments Patient with normal post-surgical swelling present to L LE   Posture    Posture Forward head position;Protracted shoulders;Kyphosis   Posture Comments Moderate sitting EOB and standing   Range of Motion (ROM)   ROM Comment Did not formally assess, demonstrates functional ROM with mobility   Strength (Manual Muscle Testing)   Strength Comments Did not formally assess, demonstrates functional strength with mobility   Bed Mobility   Comment, (Bed Mobility) Needs Anupama to L LE for bed mobility only   Transfers   Comment, (Transfers) Sit<>stand transfer with CGA using walker   Gait/Stairs (Locomotion)   Comment, (Gait/Stairs) SBA for ambulation with walker   Balance   Balance Comments Independent sitting  balance, SBA for standing balance with UE support from walker   Sensory Examination   Sensory Perception WNL   Clinical Impression   Criteria for Skilled Therapeutic Intervention Yes, treatment indicated   PT Diagnosis (PT) impaired functional mobility   Influenced by the following impairments increased post-op pain, decreased L LE strength and ROM   Functional limitations due to impairments impaired bed mobility, transfers and ambulation   Clinical Presentation (PT Evaluation Complexity) Stable/Uncomplicated   Clinical Presentation Rationale Per clinical judgment   Clinical Decision Making (Complexity) low complexity   Planned Therapy Interventions (PT) bed mobility training;gait training;home exercise program;ROM (range of motion);strengthening;transfer training;progressive activity/exercise;risk factor education;home program guidelines   Anticipated Equipment Needs at Discharge (PT)   (has walker)   Risk & Benefits of therapy have been explained evaluation/treatment results reviewed;care plan/treatment goals reviewed;current/potential barriers reviewed;risks/benefits reviewed   PT Discharge Planning   PT Discharge Recommendation (DC Rec) home with assist;home with home care physical therapy   PT Rationale for DC Rec PT: For home safety evaluation and progression per TKA protocol   PT Brief overview of current status PT: Nora to L LE for bed mobility, CGA to SBA for transfers and ambulation using walker   Total Evaluation Time   Total Evaluation Time (Minutes) 8   Physical Therapy Goals   PT Frequency One time eval and treatment only   PT Predicted Duration/Target Date for Goal Attainment 07/13/22   PT Goals Bed Mobility;Transfers;Gait;PT Goal 1   PT: Bed Mobility Minimal assist;Supine to/from sit;Completed   PT: Transfers Sit to/from stand;Bed to/from chair;Assistive device;Completed;Supervision/stand-by assist   PT: Gait Supervision/stand-by assist;Assistive device;100 feet;Completed   PT: Goal 1 Independent  with HEP per TKA protocol- Completed

## 2022-07-13 NOTE — ANESTHESIA POSTPROCEDURE EVALUATION
Patient: Jluis Duke    Procedure: Procedure(s):  Revision Left total knee arthroplasty       Anesthesia Type:  Spinal    Note:  Disposition: Inpatient   Postop Pain Control: Uneventful            Sign Out: Well controlled pain   PONV: No   Neuro/Psych: Uneventful            Sign Out: Acceptable/Baseline neuro status   Airway/Respiratory: Uneventful            Sign Out: Acceptable/Baseline resp. status   CV/Hemodynamics: Uneventful            Sign Out: Acceptable CV status; No obvious hypovolemia; No obvious fluid overload   Other NRE: NONE   DID A NON-ROUTINE EVENT OCCUR? No           Last vitals:  Vitals Value Taken Time   /73 07/12/22 1700   Temp 36.6  C (97.9  F) 07/12/22 1700   Pulse 84 07/12/22 1710   Resp 17 07/12/22 1711   SpO2 95 % 07/12/22 1700   Vitals shown include unvalidated device data.    Electronically Signed By: Rojas Estevez MD  July 12, 2022  3:56 PM

## 2022-07-13 NOTE — PROGRESS NOTES
"Orthopedic Surgery Progress Note: 07/13/2022    Subjective:   No acute events overnight. Pain well-controlled. Tolerating a diet. Voiding. Denies CP/SOB/F/C.   No new concerns or complaints. Excited to possible go home today     Objective:   /68 (BP Location: Left arm, Patient Position: Semi-Palmer's, Cuff Size: Adult Regular)   Pulse 86   Temp 97.2  F (36.2  C) (Oral)   Resp 19   Ht 1.57 m (5' 1.81\")   Wt 118.3 kg (260 lb 12.9 oz)   SpO2 94%   BMI 47.99 kg/m    No intake/output data recorded.  General: NAD. Resting comfortably in bed.  Respiratory: Breathing comfortably on 1L NC  Drain Output: None   Musculoskeletal:         LLE  Dressings clean and dry, some early bruising about the incision.    SILT in sural, saphenous, superficial peroneal nerve, deep peroneal nerve, and tibial nerve distributions  Fires FHL/TA and EHL/GSC without issue  2+ DP Pulse. Foot is WWP      Laboratory Data:  No results found for: WBC, HGB, PLT, INR    Images:  PACU XR of Left knee reviewed. Hardware well aligned. No fracture appreciated.     Assessment & Plan:   Assessment/Plan: Jluis Duke is a 71 year old female s/p Procedure(s):  Revision Left total knee arthroplasty on 7/12/2022 with Dr. Mayo.     Activity: Up with assist and assistive devices as needed until independent. Knee ROM as tolerated.  Weight bearing status: WBAT    Antibiotics: Cefazolin until intraoperative cultures have resulted , 14 day course extended antibiotics on discharge  Diet: Begin with clear fluids and progress diet as tolerated. Bowel regimen. Anti-emetics PRN.    DVT prophylaxis: Aspirin 162mg daily x 4 weeks, starting morning of POD 1  Elevation: Elevate heels off of bed on pillows, no pillows behind the knee at any time    Wound Care: Dressing change POD 7  Drains: None  Beckman: None  Pain management: Orals PRN, IV for breakthrough only  X-rays: AP/Lat knee XR in PACU  Physical Therapy: Mobilization, ROM, ADL's  Occupational Therapy: " ADL's  Labs: Trend Hgb on PODs #1 & 2  Cultures: None  Consults: PT, OT. Hospitalist, appreciate assistance in caring for this patient throughout the perioperative period  - Follow-up: Clinic with Dr. Mayo team in 2 weeks for a wound check    - Disposition: Pending progress with therapies, pain control on orals, and medical stability, anticipate discharge to Home on POD #1-2    Orthopedic surgery staff for this patient is Dr. Mayo.    ------------------------------------------------------------------------------------------    Mk Aviles MD  Orthopedic Surgery PGY1  [phone]    Please page me directly with any questions/concerns during regular weekday hours before 5 pm. If there is no response, if it is a weekend, or if it is during evening hours then please page the orthopedic surgery resident on call.    FOLLOWUP:    Future Appointments   Date Time Provider Department Center   7/13/2022  8:15 AM Nicci Sanches PT URPT [place]   7/13/2022 10:15 AM Angelique Arnold, CASSIDY UROT [place]   7/13/2022  2:00 PM Nicci Sanches PT URPT [place]   8/8/2022  2:00 PM Minesh Mayo MD [facility]

## 2022-07-13 NOTE — PLAN OF CARE
"Left TKA, Revision  Blood pressure 130/68, pulse 86, temperature 97.2  F (36.2  C), temperature source Oral, resp. rate 19, height 1.57 m (5' 1.81\"), weight 118.3 kg (260 lb 12.9 oz), SpO2 93 %, not currently breastfeeding.     Pt. A&OX4  2 L of oxygen via nc  CAPNO on. ETCO2 35 mmHg RR 17-22  Aqacel dressing  and ace wrap on. Site red and warm to touch. On call Ortho notified and stated, Site will be assessed in the AM  C/O pain rating at 3 and then 8 post ambulation  Oxycodone given. Pt states pain is unrealived  IV Dilaudid given x 1, Pt pain reduced to 3/10  Ice applied to site  Oxygen weaned to 1 L, tolerating well  Pt up and ambulated to bathroom with assist of 1 with walker and  gait belt x 3 during this shift  LR running at 75 mlhr  Continue POC         "

## 2022-07-13 NOTE — PROGRESS NOTES
Prior Authorization **INITIATED**    Medication: Hydroxyzine 10mg tabs  Insurance Company: [employer] [place] - Phone [phone] Fax [phone]  Pharmacy Filling the Rx: [facility] - [place], [place] - [address]  Filling Pharmacy Phone: [phone]  Filling Pharmacy Fax: [phone]  Start Date: 7/13/2022  Reference #: CoverMyMeds Key: BEFYYKJAY [place]ILL[place] Case ID: [id number]  Comments:  HRM (high risk medication) for elderly patients per [place] Criteria List--PA required. Requires acknowledgement that the prescriber has assessed risk versus benefit in using this High Risk Medication (HRM). Discharge pharmacy sent patient with supply while auth is pending. Will retroactively bill once determination received.      Laila Hdez [facility] Discharge Pharmacy Liaison  Pronouns: She/Her/Hers    [facility]  [address]  [address], [place] [address]   [email or web address]  [email or web address]   Phone: [phone]  Pager: [phone]  Fax: [phone]

## 2022-07-13 NOTE — PROGRESS NOTES
"[facility]    Medicine Progress Note - Hospitalist Service, GOLD TEAM 16    Date of Admission:  7/12/2022    Assessment & Plan            Jluis Duke is a 71 year old female admitted on 7/12/2022. She has a known h/o Hypertension, MELVINA, diabetes, obesity, RLS and OA. She has a history of bilateral TKA in 2008. She was doing well until about 1 year ago when she developed left knee discomfort  She underwent revision of her left TKA today ( 7/12).   Internal medicine consulted for postoperative co-management  Individual problems and their management are outlined below      S/P Revision TKA,71/12    Pain management , DVT prophylaxis, Activity , Wound cares, Dressing changes, Leidy-operative antibiotics per primary orthopedic service .  We will monitor for acute blood loss anemia.   Pre op Hgb at 15.3     MELVINA:  Although patient uses a CPAP at home, she did not bring it in on purpose.     DM Type 2, A1c was 5.7 6/27/22.   Resume Jardiance 10 mg am   Resume Metformin 500 mg with dinner from tomorrow  BID blood sugars     HTN:  Resume micardis on discharge   Resume metoprolol 50 mg BID with hold parameters       Dyslipidemia:   Resume Lipitor 80 mg and Zetia 10 mg        # Severe Obesity: Estimated body mass index is 47.99 kg/m  as calculated from the following:    Height as of this encounter: 1.57 m (5' 1.81\").    Weight as of this encounter: 118.3 kg (260 lb 12.9 oz).    Defer outpatient management for thius          Diet: Advance Diet as Tolerated: Regular Diet Adult  Regular Diet Adult    DVT Prophylaxis: Defer to primary service  Beckman Catheter: Not present  Central Lines: None  Cardiac Monitoring: None  Code Status: Full Code      Disposition Plan         The patient's care was discussed with the Bedside Nurse, Care Coordinator/, Patient and Primary team.    Elsa Waddell MD  Hospitalist Service, GOLD TEAM 16  [facility] " "[facility]  Securely message with the Vocera Web Console (learn more here)  Text page via [facility] Paging/Directory   Please see signed in provider for up to date coverage information      Clinically Significant Risk Factors Present on Admission                 # Hypertension: home medication list includes antihypertensive(s)    # Severe Obesity: Estimated body mass index is 47.99 kg/m  as calculated from the following:    Height as of this encounter: 1.57 m (5' 1.81\").    Weight as of this encounter: 118.3 kg (260 lb 12.9 oz).        ______________________________________________________________________    Interval History   No new symptoms reported per nursing staff .  Last night notes reviewed .  No chest pain or Shortness of breath reported.  No vomiting   No difficulty with voiding   Passing gas .    4 system ROS reviewed .    Data reviewed today: I reviewed all medications, new labs and imaging results over the last 24 hours.   Physical Exam   Vital Signs: Temp: 97.2  F (36.2  C) Temp src: Oral BP: 130/68 Pulse: 86   Resp: 19 SpO2: 94 % O2 Device: Nasal cannula Oxygen Delivery: 1 LPM  Weight: 260 lbs 12.87 oz  Constitutional: awake, alert, cooperative, no apparent distress, and appears stated age  Eyes: Lids and lashes normal, pupils equal, round and reactive to light, extra ocular muscles intact, sclera clear, conjunctiva normal  Hematologic / Lymphatic: no cervical lymphadenopathy  Respiratory: No increased work of breathing, good air exchange, clear to auscultation bilaterally, no crackles or wheezing  Cardiovascular: Normal apical impulse, regular rate and rhythm, normal S1 and S2, no S3 or S4, and no murmur noted  GI: No scars, normal bowel sounds, soft, non-distended, non-tender, no masses palpated, no hepatosplenomegally  Skin: no jaundice  Neurologic: Awake, alert, oriented to name, place and time.  Cranial nerves II-XII are grossly intact.   Neuropsychiatric: General: normal, calm and normal eye " contact    Data   Recent Labs   Lab 07/12/22  2136 07/12/22  1501 07/12/22  [id number]   [facility] 154* 125* 118*

## 2022-07-13 NOTE — PHARMACY-ADMISSION MEDICATION HISTORY
Admission Medication History Completed by Pharmacy    See [facility] Admission Navigator for allergy information, preferred outpatient pharmacy, prior to admission medications and immunization status.     Medication History Sources:     Patient- pt has a list of home medications, although Calcium/vit D was not on the list.    Medication fill history    Changes made to PTA medication list (reason):    Added: Calcium w/ vitD (per patient)    Deleted: Cephalexin, Hydrocodone-acetaminophen, Ibuprofen, Oxybutynin and the duplicate of fish oil (per patient)    Changed: metoprolol sig >> 75 mg po bid    Multiple vitamin sig >> 1 tablet po daily    Fish oil 2400 mg po once >> 2400 mg po daily    Additional Information:    None    Prior to Admission medications    Medication Sig Last Dose Taking? Auth Provider Long Term End Date   aspirin 81 MG EC tablet Take 81 mg by mouth daily 7/4/2022 at Unknown time Yes Reported, Patient     atorvastatin (LIPITOR) 80 MG tablet Take 80 mg by mouth every evening 7/11/2022 at Unknown time Yes Reported, Patient Yes    CALCIUM-VITAMIN D PO Take 1 tablet by mouth daily  Yes Unknown, Entered By History     empagliflozin (JARDIANCE) 10 MG TABS tablet Take 10 mg by mouth every morning 7/11/2022 at Unknown time Yes Reported, Patient     ezetimibe (ZETIA) 10 MG tablet Take 10 mg by mouth every morning 7/11/2022 at Unknown time Yes Reported, Patient Yes    metFORMIN (GLUCOPHAGE XR) 500 MG 24 hr tablet Take 500 mg by mouth daily (with breakfast) 7/11/2022 at Unknown time Yes Reported, Patient Yes    metoprolol tartrate (LOPRESSOR) 50 MG tablet Take 75 mg by mouth 2 times daily 7/12/2022 at Unknown time Yes Reported, Patient Yes    Multiple Vitamin (ONCE DAILY PO) Take 1 tablet by mouth daily Past Week at Unknown time Yes Reported, Patient     Omega-3 Fatty Acids (FISH OIL) 1200 MG capsule Take 2,400 mg by mouth daily Past Week at Unknown time Yes Reported, Patient     telmisartan-HCTZ (MICARDIS HCT)  80-12.5 MG tablet Take 2 tablets by mouth every morning 7/11/2022 at Unknown time Yes Reported, Patient Yes    Continuous Blood Gluc  (FREESTYLE RADHA 14 DAY READER) ANABEL CHECK BLOOD SUGARS FASTING AND ALTERNATING BEFORE SUPPER AND BEDTIME.   Reported, Patient     Continuous Blood Gluc Sensor (FREESTYLE RADHA 14 DAY SENSOR) MISC APPLY EVERY 14 DAYS TO CHECK BLOOD SUGARS   Reported, Patient         Date completed: 07/12/22    Medication history completed by: Felisha Aragon RPH

## 2022-07-13 NOTE — PROGRESS NOTES
Pt. Discharged at 1317 from hospital via private car, was picked up by son. Discharge to home. Pt. Received discharge instructions for care of LLE. Writer went over discharge medication from hospital. Pt. Stated and signed paper work that she understood information. Pt. Went home with all belongings.

## 2022-07-13 NOTE — PLAN OF CARE
OT 8: Orders received. Met with pt just getting back into bed from bathroom. Pt reports ind with toilet transfer and toileting, has tall toilet at home. Reviewed AE and CS for safety with ADL, pt receptive to all information, declines concerns for home. Pt will have granddaughter to assist today and also has a good friend who lives in the building. No skilled IP OT needs. OT will defer and complete orders.

## 2022-07-13 NOTE — PLAN OF CARE
VS: VSS - 2L oxygen via nasal cannula - capno on.      Output: Voided x1 using commode  BS audible   Lungs: CTA, encouraged deep breathing   Activity: Assist of 1 with walker and gait belt. Stood at bedside to commode and ambulated across her room to the chair to eat dinner.    Skin: Skin intact x L knee incision   Pain:   Oxycodone,robaxin, and IV dilaudid for pain management  Rates pain a 1/10 at rest and 4-6 out of 10 with activity - increased pain when going from standing and lowering into chair to sit.   Neuro/CMS:   A&Ox4  CMS intact, denies N/T in LLE, it is warm to touch, no redness or swelling noted.    Dressing(s):   ACE wrap was not removed yet post op - per orders ok to remove ACE wrap for skin assessment, do NOT remove dressing - ACE will be removed tomorrow. No drainage noted.   Diet:   Regular diet. Tolerating oral intake without nausea.   LDA:   PIV infusing LR 75mL/hr   Equipment:   CAPNO, IV pole, commode, walker, gait belt   Plan:   Plan to ambulate this evening, ambulate per orders, monitor vitals and CMS, continue POC.    Additional Info:   Son at bedside. Patient pleasant.

## 2022-07-14 ENCOUNTER — PATIENT OUTREACH (OUTPATIENT)
Dept: CARE COORDINATION | Facility: CLINIC | Age: 72
End: 2022-07-14

## 2022-07-14 DIAGNOSIS — Z71.89 OTHER SPECIFIED COUNSELING: ICD-10-CM

## 2022-07-14 NOTE — PROGRESS NOTES
Clinic Care Coordination Contact  [facility]: Post-Discharge Note  SITUATION                                                      Admission:    Admission Date: 07/12/22   Reason for Admission: S/P revision of total knee, left  Discharge:   Discharge Date: 07/13/22  Discharge Diagnosis: S/P revision of total knee, left    BACKGROUND                                                      Per hospital discharge summary and inpatient provider notes:  Jluis Duke is a 71 year old female who underwent the above procedure today.  The procedure was initially tried under spinal anesthesia, failed and had to transition to general anesthesia.  Estimated blood loss was 100 mL.  Patient received about 1500 mL of lactated Ringer solution as perioperative fluids.  Postoperatively patient recovered well.     I met patient up on floor 8 Ortho, where she was resting comfortably.  She denies any chest pain or shortness of breath.  She denies any fever or chills.  She denies any nausea, vomiting or diarrhea.  She was still experiencing some tingling and numbness in her left lower extremity.  Her son was present at bedside.  All her medications were reviewed and reconciled.  She is in a pleasant mood.    ASSESSMENT      Enrollment  Primary Care Care Coordination Status: Not a Candidate    Discharge Assessment  How are you doing now that you are home?: pretty good but still a bit difficult  How are your symptoms? (Red Flag symptoms escalate to triage hotline per guidelines): Improved  Do you feel your condition is stable enough to be safe at home until your provider visit?: Yes  Does the patient have their discharge instructions? : Yes  Does the patient have questions regarding their discharge instructions? : No  Were you started on any new medications or were there changes to any of your previous medications? : Yes  Does the patient have all of their medications?: Yes  Do you have questions regarding any of your medications? :  No  Do you have all of your needed medical supplies or equipment (DME)?  (i.e. oxygen tank, CPAP, cane, etc.): Yes  Discharge follow-up appointment scheduled within 14 calendar days? : Yes                  PLAN                                                      Outpatient Plan:  You should be seen for a post operative appointment with Dr. Mayo approximately four weeks after surgery. If you  want a virtual visit, please check with your physician to see whether or not a virtual visit is acceptable. To check on  your appointment time, either look on your after visit summary (AVS) or look it up in [facility] on your appointment list  or call the clinic at the number below.  Your follow up appointments will be at the location that you regularly see Dr. Mayo:  [facility] [place] [address]  [phone]    Future Appointments   Date Time Provider Department Center   7/20/2022  9:20 AM Jovani Sotomayor, PT [facility]   7/28/2022 11:20 AM Nicolasa Youngblood, PTA [facility]   8/3/2022 11:20 AM Penny Castañeda, PT [facility]   8/8/2022  2:00 PM Minesh Mayo MD [facility]         For any urgent concerns, please contact our 24 hour nurse triage line: [phone] ([phone])         Carmenza Ash MA

## 2022-07-14 NOTE — PROGRESS NOTES
Prior Authorization **APPROVED**    Authorization Effective Date: 4/14/2022  Authorization Expiration Date: 7/13/2023  Medication: Hydroxyzine 10mg tabs **APPROVED**  Approved Dose/Quantity: 4 tablets daily  Reference #: CoverMyMeds Key: BEFYYKJAY [place] Case ID: [id number], [place] Case ID: [id number]   Insurance Company: [employer] [place] - Phone [phone] Fax [phone]  Expected CoPay: $1.22     CoPay Card Available: No    Foundation Assistance Needed: n/a  Which Pharmacy is filling the prescription (Not needed for infusion/clinic administered): [facility] [place], [place] - [address]  Pharmacy Notified: Yes  Patient Notified: Yes  Comments:  HRM (high risk medication) for elderly patients per [place] Criteria List--[place] required. Requires acknowledgement that the prescriber has assessed risk versus benefit in using this High Risk Medication (HRM). Discharge pharmacy sent patient with supply while auth is pending. *Retroactively Billed*            Laila Hdez CPhT  [facility] Liaison  Pronouns: She/Her/Hers    [facility]  [address] [facility]  [address] [address]   [email or web address]  [email or web address]   Phone: [phone]  Pager: [phone]  Fax: [phone]

## 2022-07-14 NOTE — DISCHARGE SUMMARY
ORTHOPAEDIC SURGERY DISCHARGE SUMMARY      Date of Admission: 7/12/2022  Date of Discharge: 7/13/2022  1:11 PM  Disposition: Home  Staff Physician: No att. providers found  Primary Care Provider: Olivia Aviles    DISCHARGE DIAGNOSIS:  Chronic pain of left knee [M25.562, G89.29]    PROCEDURES: Procedure(s):  Revision Left total knee arthroplasty  on 7/12/2022    BRIEF HISTORY:  Jluis Duke is a 70 y/o woman has had successful knee arthroplasties performed in 2008 by Dr. Sher Weinstein however she began having discomfort proximally 1 year ago involving the left knee joint that has progressively gotten worse to the point where she has been nearly unable to walk over the past several weeks. Work up of left knee pain was negative for infection and most concerning for aseptic loosening of the left tibial component. The patient elected for revision left TKA    HOSPITAL COURSE:    The patient was admitted following the above listed procedures for pain control and rehabilitation. Jluis Duke did well post-operatively. Medicine was consulted post operatively to aid in management of medical co-morbidities. The patient received routine nursing cares and at the time of discharge was medically stable. Vital signs were stable throughout admission. The patient is tolerating a regular diet and is voiding spontaneously. All PT/OT goals have been met for safe mobility. Pain is now controlled on oral medications which will be available on discharge. Stool softeners have been used while taking pain medications to help prevent constipation. Jluis Duke is deemed medically safe to discharge.     Antibiotics:  Ancef given periop and 24 hours postop. Patient was discharged with 14 days of Keflex  DVT prophylaxis:  ASA 162mg every day initiated after surgery and will be continued for 4 weeks.   PT Progress:  Has met PT/OT goals for safe mobility.    Pain Meds:  Weaned off all IV pain meds by discharge.  Inpatient Events: No  "significant events or complications.     PHYSICAL EXAM:    /68 (BP Location: Left arm, Patient Position: Semi-Palmer's, Cuff Size: Adult Regular)   Pulse 86   Temp 97.2  F (36.2  C) (Oral)   Resp 19   Ht 1.57 m (5' 1.81\")   Wt 118.3 kg (260 lb 12.9 oz)   SpO2 94%   BMI 47.99 kg/m    No intake/output data recorded.  General: NAD. Resting comfortably in bed.  Respiratory: Breathing comfortably on 1L NC  Drain Output: None   Musculoskeletal:            LLE  Dressings clean and dry, some early bruising about the incision.    SILT in sural, saphenous, superficial peroneal nerve, deep peroneal nerve, and tibial nerve distributions  Fires FHL/TA and EHL/GSC without issue  2+ DP Pulse. Foot is WWP    FOLLOWUP:    Follow up with Dr. Mayo on 8/8/22y.    Future Appointments   Date Time Provider Department Center   7/20/2022  9:20 AM Jovani Sotomayor, PT [facility]   7/28/2022 11:20 AM Nicolasa Youngblood, PTA [facility]   8/3/2022 11:20 AM Penny Castañeda, PT [facility]   8/8/2022  2:00 PM Minesh Mayo MD [facility]       Orthopaedic Surgery appointments are at the [facility] ([address] [place] [address]). Call [phone] to schedule a follow-up appointment at this location with your provider.     PLANNED DISCHARGE ORDERS:     DVT Prophylaxis: ASA 162mg every day x 4 weeks     Activity: WBAT     Wound Care: see Below     Antibiotics: 14 days of keflex QID      Discharge Medication List as of 7/13/2022  9:53 AM      START taking these medications    Details   acetaminophen (TYLENOL) 325 MG tablet Take 2 tablets (650 mg) by mouth every 4 hours as needed for other, Disp-60 tablet, R-0, E-PrescribeDischarge today      HYDROmorphone (DILAUDID) 2 MG tablet Take 1-2 tablets (2-4 mg) by mouth every 3 hours as needed for moderate to severe pain, Disp-40 tablet, R-0, E-Prescribe      hydrOXYzine (ATARAX) 10 MG tablet Take 1 tablet (10 mg) by mouth " every 6 hours as needed for other (adjuvant pain), Disp-40 tablet, R-0, E-Prescribe      polyethylene glycol (MIRALAX) 17 g packet Take 17 g by mouth daily, Disp-10 packet, R-0, E-Prescribe      tiZANidine (ZANAFLEX) 4 MG tablet Take 1 tablet (4 mg) by mouth 3 times daily as needed for muscle spasms, Disp-40 tablet, R-0, E-Prescribe         CONTINUE these medications which have CHANGED    Details   aspirin (ASA) 81 MG EC tablet Take 2 tablets (162 mg) by mouth daily, Disp-60 tablet, R-0, E-Prescribe      cephALEXin (KEFLEX) 500 MG capsule Take 1 capsule (500 mg) by mouth 4 times daily for 14 days, Disp-56 capsule, R-0, E-Prescribe         CONTINUE these medications which have NOT CHANGED    Details   atorvastatin (LIPITOR) 80 MG tablet Take 80 mg by mouth every evening, Historical      CALCIUM-VITAMIN D PO Take 1 tablet by mouth daily, Historical      empagliflozin (JARDIANCE) 10 MG TABS tablet Take 10 mg by mouth every morning, Historical      ezetimibe (ZETIA) 10 MG tablet Take 10 mg by mouth every morning, Historical      metFORMIN (GLUCOPHAGE XR) 500 MG 24 hr tablet Take 500 mg by mouth daily (with breakfast), Historical      metoprolol tartrate (LOPRESSOR) 50 MG tablet Take 75 mg by mouth 2 times daily, Historical      Multiple Vitamin (ONCE DAILY PO) Take 1 tablet by mouth daily, Historical      Omega-3 Fatty Acids (FISH OIL) 1200 MG capsule Take 2,400 mg by mouth daily, Historical      telmisartan-HCTZ (MICARDIS HCT) 80-12.5 MG tablet Take 2 tablets by mouth every morning, Historical      Continuous Blood Gluc  (FREESTYLE RADHA 14 DAY READER) ANABEL CHECK BLOOD SUGARS FASTING AND ALTERNATING BEFORE SUPPER AND BEDTIME., Historical      Continuous Blood Gluc Sensor (FREESTYLE RADHA 14 DAY SENSOR) MISC APPLY EVERY 14 DAYS TO CHECK BLOOD SUGARS, Historical         STOP taking these medications       oxyCODONE (ROXICODONE) 5 MG tablet Comments:   Reason for Stopping:                 Discharge Procedure  "Orders   Referral Order to Outpatient Physical Therapy   Standing Status: Future   Referral Priority: Routine Referral Type: Rehab Therapy Physical Therapy   Number of Visits Requested: 1     Reason for your hospital stay   Order Comments: You stayed in the hospital overnight following your surgery     Contact Surgeon Team   Order Comments: You may experience symptoms that require follow-up before your scheduled appointment. Refer to the \"Stoplight Tool\" in your discharge papers for instructions on when to contact Dr. Mayo's office if you are concerned about pain control, blood clots, constipation, or if you are unable to urinate.  You may also contact Dr. Mayo's nurse coordinator, Lesley Linda RN, directly, preferably via [employer] or [phone].    Regular business hours (Monday - Friday, 8am - 5pm):  [facility]: [phone]    After hours and weekends:  [facility] on call Orthopedic resident: [phone]     Orthopedic Urgent Care   Order Comments: If you are not able to reach Dr. Mayo's office and you need immediate care, go to the Orthopedic Urgent Care at your Surgeon's office.  Do NOT go to the Emergency Room unless you have shortness of breath, chest pain, or other signs of a medical emergency.     Call 911   Order Comments: Call 911 immediately if you experience sudden-onset chest pain, arm weakness/numbness, slurred speech, or shortness of breath     Breathing exercises   Order Comments: Perform breathing exercises using your Incentive Spirometer 10 times per hour while awake for 2 weeks.     Fever Management   Order Comments: A low grade fever can be expected after surgery.  Use acetaminophen (TYLENOL) as needed for fever management.  Contact your Surgeon Team if you have a fever greater than 101.5 F, chills, and/or night sweats.     Constipation management   Order Comments: Constipation (hard, dry bowel movements) is expected " after surgery due to the combination of being less active, the anesthetic, and the opioid pain medication.  You can do the following to help reduce constipation:  ~  FLUIDS:  Drink clear liquids (water or Gatorade), or juice (apple/prune).  ~  DIET:  Eat a fiber rich diet.    ~  ACTIVITY:  Get up and move around several times a day.  Increase your activity as you are able.  MEDICATIONS:  Reduce the risk of constipation by starting medications before you are constipated.  You can take Miralax   (1 packet as directed) and/or a stool softener (Senokot 1-2 tablets 1-2 times a day).  If you already have constipation and these medications are not working, you can get magnesium citrate and use as directed.  If you continue to have constipation you can try an over the counter suppository or enema.  Call your Surgeon Team if it has been greater than 3 days since your last bowel movement.     Reduced Urine Output   Order Comments: Changes in the amount of fluids you drank before and after surgery may result in problems urinating.  It is important to stay well-hydrated after surgery and drink plenty of water. If it has been greater than 8 hours since you have urinated despite drinking plenty of water, call your Surgeon Team.     Anticoagulation - aspirin   Order Comments: Take the aspirin as prescribed for a total of four weeks after surgery.  This is given to help minimize your risk of blood clot.     Activity - Exercises to prevent blood clots   Order Comments: Unless otherwise directed by your Surgeon team, perform the following exercises at least three times per day for the first four weeks after surgery to prevent blood clots in your legs: 1) Point and flex your feet (Ankle Pumps), 2) Move your ankle around in big circles, 3) Wiggle your toes, 4) Walk, even for short distances, several times a day, will help decrease the risk of blood clots.     Order Specific Question Answer Comments   Is discharge order? Yes      Pain  "after Surgery   Order Comments: Pain after surgery is normal and expected.  You will have some amount of pain for several weeks after surgery.  Your pain will improve with time.  There are several things you can do to help reduce your pain including: rest, ice, elevation, and using pain medications as needed. Contact your Surgeon Team if you have pain that persists or worsens after surgery despite rest, ice, elevation, and taking your medication(s) as prescribed. Contact your Surgeon Team if you have new numbness, tingling, or weakness in your operative extremity.     Swelling after Surgery   Order Comments: Swelling and/or bruising of the surgical extremity is common and may persist for several months after surgery. In addition to frequent icing and elevation, gentle compressive support with an ACE wrap or tubigrip may help with swelling. Apply compression regularly, removing at least twice daily to perform skin checks. Contact your Surgeon Team if your swelling increases and is NOT associated with an increase in your activity level, or if your swelling increases and is associated with redness and pain.     LOWER Extremity Elevation   Order Comments: Swelling is expected for several months after surgery. This type of swelling is usually associated with gravity and activity, and can be improved with elevation.   The best way to do this is to get your \"toes above your nose\" by laying down and placing several pillows lengthwise under your calf and heel. When elevating your leg keep your knee completely straight. Perform this elevation as often as possible especially for the first two weeks after surgery.     Cold therapy   Order Comments: Ice can be used to control swelling and discomfort after surgery. Place a thin towel over your operative site and apply the ice pack overtop. Leave ice pack in place for 20 minutes, then remove for 20 minutes. Repeat this 20 minutes on/20 minutes off routine as often as tolerated. "     acetaminophen (TYLENOL) Instructions   Order Comments: You were discharged with acetaminophen (TYLENOL) for pain management after surgery. Acetaminophen most effectively manages pain symptoms when it is taken on a schedule without missing doses (every four, six, or eight hours). Your Provider will prescribe a safe daily dose between 3000 - 4000 mg.  Do NOT exceed this daily dose. Most patients use acetaminophen for pain control for the first four weeks after surgery.  You can wean from this medication as your pain decreases.     NSAID Instructions   Order Comments: You were discharged with an anti-inflammatory medication for pain management to use in combination with acetaminophen (TYLENOL) and the narcotic pain medication.  Take this medication exactly as directed.  You should only take one anti-inflammatory at a time.  Some common anti-inflammatories include: ibuprofen (ADVIL, MOTRIN), naproxen (ALEVE, NAPROSYN), celecoxib (CELEBREX), meloxicam (MOBIC), ketorolac (TORADOL).  Take this medication with food and water.     Opioids - Tapering Instructions   Order Comments: In the first three days following surgery, your symptoms may warrant use of the narcotic pain medication every four to six hours as prescribed. This is normal. As your pain symptoms improve, focus your efforts on decreasing (tapering) use of narcotic medications. The most successful tapering strategy is to first, decrease the number of tablets you take every 4-6 hours to the minimum prescribed. Then, increase the amount of time between doses.  For example:  First, taper to   or 1 tablet every 4-6 hours.  Then, taper to   or 1 tablet every 6-8 hours.  Then, taper to   or 1 tablet every 8-10 hours.  Then, taper to   or 1 tablet every 10-12 hours.  Then, taper to   or 1 tablet at bedtime.  The bedtime dose can help with comfort during sleep and is typically the last dose to be discontinued after surgery.     Follow Up Care   Order Comments: You  should be seen for a post operative appointment with Dr. Mayo approximately four weeks after surgery.  If you want a virtual visit, please check with your physician to see whether or not a virtual visit is acceptable.   To check on your appointment time, either look on your after visit summary (AVS) or look it up in [employer] on your appointment list or call the clinic at the number below.    Your follow up appointments will be at the location that you regularly see Dr. Mayo:    [facility]  [address] [place], [place] [address] [phone]     Activity - Total Knee Arthroplasty     Order Specific Question Answer Comments   Is discharge order? Yes      Return to Driving   Order Comments: Return to driving - Driving is NOT permitted until directed by your provider. Under no circumstance are you permitted to drive while using narcotic pain medications.     Order Specific Question Answer Comments   Is discharge order? Yes      Dressing / Wound Care - Wound   Order Comments: You have a clean dressing on your surgical wound. Dressing change instructions as follows: try to keep your dressing on as long as possible, ideally 7 days, if it is still sealed around the incision.  If not, remove and re-apply with gauze and tape until the 7th day after surgery.  Contact your Surgeon Team if you have increased redness, warmth around the surgical wound, and/or drainage from the surgical wound.     Shower with wound/dressing covered   Order Comments: You may shower 7 days after surgery as long as the surgical wound stays dry.  The shower water will cause the dressing to begin falling off which is acceptable.  Just remove it yourself.     Dressing / Wound Care - NO Tub Bathing   Order Comments: Tub bathing, swimming, or any other activities that will cause your incision to be submerged in water should be avoided until allowed by your Surgeon.     Opioid Instructions (Greater  than or equal to 65 years)   Order Comments: You were discharged with an opioid medication (hydromorphone, oxycodone, hydrocodone, or tramadol). This medication should only be taken for breakthrough pain that is not controlled with acetaminophen (TYLENOL). If you rate your pain less than 3 you do not need this medication.  Pain rating 0-3:  You do not need this medication  Pain rating 4-6:  Take 1/2 tablet every 4-6 hours as needed  Pain rating 7-10:  Take 1 tablet every 4-6 hours as needed  Do not exceed 4 tablets per day     Weight bearing as tolerated   Order Comments: Weight bearing as tolerated on your operative extremity.     Order Specific Question Answer Comments   Is discharge order? Yes      Crutches DME   Order Comments: DME Documentation: Describe the reason for need to support medical necessity: Impaired gait status post knee surgery. I, the undersigned, certify that the above prescribed supplies are medically necessary for this patient and is both reasonable and necessary in reference to accepted standards of medical practice in the treatment of this patient's condition and is not prescribed as a convenience.     Order Specific Question Answer Comments   DME Provider: [facility]    Crutch Type: Standard    Crutches Add On: NA    Length of Need: Lifetime      Cane DME   Order Comments: DME Documentation: Describe the reason for need to support medical necessity: Impaired gait status post knee surgery. I, the undersigned, certify that the above prescribed supplies are medically necessary for this patient and is both reasonable and necessary in reference to accepted standards of medical practice in the treatment of this patient's condition and is not prescribed as a convenience.     Order Specific Question Answer Comments   DME Provider: [facility]    Cane Type: Single Tip    Reminder: Patient can typically get 1 every 5 years      Walker DME   Order Comments: DME Documentation: Describe the  reason for need to support medical necessity: Impaired gait status post knee surgery. I, the undersigned, certify that the above prescribed supplies are medically necessary for this patient and is both reasonable and necessary in reference to accepted standards of medical practice in the treatment of this patient's condition and is not prescribed as a convenience.     Order Specific Question Answer Comments   DME Provider: [employer]    Walker Type: Standard (2 Wheel)    Accessories: N/A      Regular Diet Adult     Order Specific Question Answer Comments   Is discharge order? Yes      Assign Questionnaire Series to Patient           Mk Aviles MD  Orthopaedic Surgery, PGY4

## 2022-07-17 LAB
BACTERIA TISS BX CULT: NO GROWTH
BACTERIA TISS BX CULT: NO GROWTH

## 2022-07-19 LAB
BACTERIA TISS BX CULT: NORMAL
BACTERIA TISS BX CULT: NORMAL

## 2022-07-26 ENCOUNTER — ANCILLARY PROCEDURE (OUTPATIENT)
Dept: ULTRASOUND IMAGING | Facility: CLINIC | Age: 72
End: 2022-07-26
Attending: PHYSICIAN ASSISTANT
Payer: COMMERCIAL

## 2022-07-26 ENCOUNTER — TELEPHONE (OUTPATIENT)
Dept: ORTHOPEDICS | Facility: CLINIC | Age: 72
End: 2022-07-26

## 2022-07-26 DIAGNOSIS — R22.42 LOCALIZED SWELLING OF LEFT LOWER EXTREMITY: ICD-10-CM

## 2022-07-26 PROCEDURE — 93971 EXTREMITY STUDY: CPT | Mod: TC | Performed by: RADIOLOGY

## 2022-07-26 NOTE — TELEPHONE ENCOUNTER
RE: Post Op Symptoms  Received: Today  Fabiano Solorio, Olga Meyer RN  Caller: Unspecified (Today,  8:27 AM)  Steven Espinoza,     Given her symptoms we should get an ultrasound to rule out DVT. Order placed. Please have the patient go in tonight or tomorrow AM to have it done at local [place] radiology department.     If the results are negative I would recommend compression stockings, elevation and ice. Orders placed for compression stockings.     Let me know if you have any questions.     -Fabiano       -This information was relayed to the patient.    -Patient was given the radiology central scheduling phone number ([phone]) and was told to make an appointment for either tonight or tomorrow morning.    -Will call the patient once the results become available.    -Patient was in agreement of this plan.     Olga Roth RN  7/26/2022 5:15 PM

## 2022-07-26 NOTE — TELEPHONE ENCOUNTER
"-Spoke with this patient over the phone and she reports the following:    -had surgery with Dr. Mayo 2 weeks ago (revision left TKA)- 7/12/22  -patient states that her left leg is swollen, down to the ankle  -says it's \"really swollen\"  -notes that the skin is \"tight\"   -looks like a \"stretched out balloon\"  -says that her leg is achy, not sharp pain, just a dull achy pressure  -says she is very uncomfortable  -patient is taking Tylenol and Atarax, but it is not helping  -she reports that she is not sleeping well either  -patient states that she is elevating and icing often.  -she says she will occasionally put on an Ace bandage, which seems to help \"some\"  -patient is scheduled for her first PT session on Thursday (7/28/22)  -she said she cancelled her last week's PT session because she \"wasn't ready\"    1.  Patient is wondering what else she can do about this swelling?  2.  She is also wondering what she should do in terms of pain management?    -This information was routed to STAR Mario and Dr. Mayo.    -Will await further advise/ instructions.  -Patient in agreement of this plan as well.    Olga Roth RN  7/26/2022 1:24 PM     "

## 2022-07-28 ENCOUNTER — THERAPY VISIT (OUTPATIENT)
Dept: PHYSICAL THERAPY | Facility: CLINIC | Age: 72
End: 2022-07-28
Payer: COMMERCIAL

## 2022-07-28 ENCOUNTER — TELEPHONE (OUTPATIENT)
Dept: ORTHOPEDICS | Facility: CLINIC | Age: 72
End: 2022-07-28

## 2022-07-28 DIAGNOSIS — Z47.1 AFTERCARE FOLLOWING LEFT KNEE JOINT REPLACEMENT SURGERY: Primary | ICD-10-CM

## 2022-07-28 DIAGNOSIS — R60.0 LOCALIZED EDEMA: ICD-10-CM

## 2022-07-28 DIAGNOSIS — Z96.652 S/P REVISION OF TOTAL KNEE, LEFT: ICD-10-CM

## 2022-07-28 DIAGNOSIS — Z96.652 AFTERCARE FOLLOWING LEFT KNEE JOINT REPLACEMENT SURGERY: Primary | ICD-10-CM

## 2022-07-28 PROCEDURE — 97530 THERAPEUTIC ACTIVITIES: CPT | Mod: GP

## 2022-07-28 PROCEDURE — 97016 VASOPNEUMATIC DEVICE THERAPY: CPT | Mod: GP

## 2022-07-28 PROCEDURE — 97110 THERAPEUTIC EXERCISES: CPT | Mod: GP

## 2022-07-28 PROCEDURE — 97161 PT EVAL LOW COMPLEX 20 MIN: CPT | Mod: GP

## 2022-07-28 ASSESSMENT — ACTIVITIES OF DAILY LIVING (ADL)
HOW_WOULD_YOU_RATE_THE_OVERALL_FUNCTION_OF_YOUR_KNEE_DURING_YOUR_USUAL_DAILY_ACTIVITIES?: SEVERELY ABNORMAL
KNEE_ACTIVITY_OF_DAILY_LIVING_SUM: 23
PAIN: THE SYMPTOM AFFECTS MY ACTIVITY MODERATELY
LIMPING: THE SYMPTOM AFFECTS MY ACTIVITY SEVERELY
GO UP STAIRS: I AM UNABLE TO DO THE ACTIVITY
GO DOWN STAIRS: ACTIVITY IS VERY DIFFICULT
GIVING WAY, BUCKLING OR SHIFTING OF KNEE: I DO NOT HAVE THE SYMPTOM
RAW_SCORE: 23
WALK: ACTIVITY IS VERY DIFFICULT
RISE FROM A CHAIR: ACTIVITY IS SOMEWHAT DIFFICULT
KNEEL ON THE FRONT OF YOUR KNEE: I AM UNABLE TO DO THE ACTIVITY
SQUAT: ACTIVITY IS VERY DIFFICULT
AS_A_RESULT_OF_YOUR_KNEE_INJURY,_HOW_WOULD_YOU_RATE_YOUR_CURRENT_LEVEL_OF_DAILY_ACTIVITY?: SEVERELY ABNORMAL
HOW_WOULD_YOU_RATE_THE_CURRENT_FUNCTION_OF_YOUR_KNEE_DURING_YOUR_USUAL_DAILY_ACTIVITIES_ON_A_SCALE_FROM_0_TO_100_WITH_100_BEING_YOUR_LEVEL_OF_KNEE_FUNCTION_PRIOR_TO_YOUR_INJURY_AND_0_BEING_THE_INABILITY_TO_PERFORM_ANY_OF_YOUR_USUAL_DAILY_ACTIVITIES?: 50
SIT WITH YOUR KNEE BENT: ACTIVITY IS SOMEWHAT DIFFICULT
WEAKNESS: THE SYMPTOM AFFECTS MY ACTIVITY SEVERELY
STIFFNESS: THE SYMPTOM AFFECTS MY ACTIVITY MODERATELY
SWELLING: THE SYMPTOM AFFECTS MY ACTIVITY SEVERELY
STAND: ACTIVITY IS FAIRLY DIFFICULT
KNEE_ACTIVITY_OF_DAILY_LIVING_SCORE: 32.86

## 2022-07-28 NOTE — TELEPHONE ENCOUNTER
"-Called the patient and left the following information on her voicemail machine:    \"Dr. Gael Mario's PA-C has reviewed your recent lower leg ultrasound.  There was no DVT (blood clot) found.  Fabiano advises you to proceed with ice, elevation, and an Ace Bandage (as you can not tolerate compression stockings).  Fabiano also recommends you talk with your physical therapist about incorporating lymphedema therapies in with your PT sessions.\"    -Patient was advised to call us back with any further questions.    -Call back number was provided.    Olga Roth RN  7/28/2022 9:53 AM     "

## 2022-07-28 NOTE — PROGRESS NOTES
"Physical Therapy Initial Evaluation: Subjective History  Date of Surgery: 7/12/22.    Surgical Procedure/Limb: Left TKA revision  Surgeon Name: Dr. Mayo  Precautions/Restrictions: WBAT, knee ROM as tolerated    Average Daily Pain Levels: 5/10 (Location: entire knee; Quality: Aching/Throbbing)  Other Symptoms: Swelling  Symptom Mgmt Strategies: Ice & elevation, has tried ACE bandage (\"it bugs me\"), pain medication.     Prior orthopaedic history/procedures: Original TKA in 2008 (bilateral).   Prior non-operative management: PT  Next MD Appt Date: 8/8/22    Functional limitations following procedure: Pt lives alone. Reports children live nearby and has friends in the building. Lives in 2nd floor apartment with elevator access.   Pt reports difficulty: transferring in/out of cars, bed (currently sleeping in recliner), driving.   Previous level of function: Ambulated with 4WW or walking stick. Fully independent with ADLs & transportation    Patient Employment: Retired  Desired Physical Activity (Goals): Being independent with mobility & transfers. Pt enjoys spending time with friends & family, playing cards.     Post-Operative Physical Therapy Examination    Physical Mobility Status  Gait: ambulates with 4WW. Slightly antalgic, lacking TKE and lacking smooth heel-toe mechanics  Transfers:  Sit<>stand mod IND with use of AD; requires modA to lift LLE onto plinth    Anthropometric Measures     Right Left Difference   Joint ROM      Hyperextension - deg - deg - deg   Extension 0 deg -3 deg 3 deg   Flexion 120 deg 98 deg 22 deg   Circumferential Measures      Joint Line 55 cm 64 cm 9 cm   15 cm Prox 64 cm 75 cm 11 cm   Effusion 0 3+    Very edematous throughout LLE. No pitting edema, no warmth, no redness. Pt reports LE edema at baseline. DVT ruled out with ultrasound see note 07/26.      Quadriceps Muscle Activation Right Left   Isometric Quad Activation Normal Fair   Straight Leg Raising No extensor lag NT d/t poor quad " isometric     Status of Incision: Clean & healing    Assessment/Plan    Patient is a 71 year old female with left side knee complaints.    Patient has the following significant findings with corresponding treatment plan.                Diagnosis 1:  S/p L TKA revision  Pain -  hot/cold therapy, electric stimulation, manual therapy, splint/taping/bracing/orthotics, self management, education and home program  Decreased ROM/flexibility - manual therapy, therapeutic exercise, therapeutic activity and home program  Decreased strength - therapeutic exercise, therapeutic activities and home program  Inflammation - cold therapy, US, electric stimulation and self management/home program  Edema - vasopneumatics, electric stimulation, cold therapy, cryocuff and self management/home program  Impaired gait - gait training, assistive devices and home program  Impaired muscle performance - electric stimulation, neuro re-education and home program  Decreased function - therapeutic activities and home program    Therapy Evaluation Codes:   1) History comprised of:   Personal factors that impact the plan of care:      Living environment.    Comorbidity factors that impact the plan of care are:      Diabetes, Heart problems, High blood pressure, Osteoarthritis, Overweight and Sleep disorder/apnea.     Medications impacting care: High blood pressure and Pain.  2) Examination of Body Systems comprised of:   Body structures and functions that impact the plan of care:      Knee.   Activity limitations that impact the plan of care are:      Bathing, Driving, Dressing, Standing, Walking and Laying down.  3) Clinical presentation characteristics are:   Stable/Uncomplicated.  4) Decision-Making    Low complexity using standardized patient assessment instrument and/or measureable assessment of functional outcome.  Cumulative Therapy Evaluation is: Low complexity.    Previous and current functional limitations:  (See Goal Flow Sheet for this  information)    Short term and Long term goals: (See Goal Flow Sheet for this information)     Communication ability:  Patient appears to be able to clearly communicate and understand verbal and written communication and follow directions correctly.  Treatment Explanation - The following has been discussed with the patient:   RX ordered/plan of care  Anticipated outcomes  Possible risks and side effects  This patient would benefit from PT intervention to resume normal activities.   Rehab potential is good.    Frequency:  1 X week, once daily  Duration:  for 8 weeks  Discharge Plan:  Achieve all LTG.  Independent in home treatment program.  Return to previous functional level by discharge.  Reach maximal therapeutic benefit.    Please refer to the daily flowsheet for treatment today, total treatment time and time spent performing 1:1 timed codes.

## 2022-07-29 ENCOUNTER — TELEPHONE (OUTPATIENT)
Dept: ORTHOPEDICS | Facility: CLINIC | Age: 72
End: 2022-07-29

## 2022-07-29 DIAGNOSIS — Z96.652 S/P REVISION OF TOTAL KNEE, LEFT: ICD-10-CM

## 2022-07-29 RX ORDER — HYDROXYZINE HYDROCHLORIDE 10 MG/1
10 TABLET, FILM COATED ORAL EVERY 6 HOURS PRN
Qty: 40 TABLET | Refills: 0 | Status: SHIPPED | OUTPATIENT
Start: 2022-07-29

## 2022-07-29 RX ORDER — ACETAMINOPHEN 325 MG/1
650 TABLET ORAL EVERY 4 HOURS PRN
Qty: 60 TABLET | Refills: 0 | Status: SHIPPED | OUTPATIENT
Start: 2022-07-29

## 2022-07-29 RX ORDER — OXYCODONE HYDROCHLORIDE 5 MG/1
5-10 TABLET ORAL EVERY 4 HOURS PRN
Qty: 30 TABLET | Refills: 0 | Status: SHIPPED | OUTPATIENT
Start: 2022-07-29 | End: 2022-08-01

## 2022-07-29 NOTE — PROGRESS NOTES
[facility]    OUTPATIENT Physical Therapy ORTHOPEDIC EVALUATION  PLAN OF TREATMENT FOR OUTPATIENT REHABILITATION  (COMPLETE FOR INITIAL CLAIMS ONLY)  Patient's Last Name, First Name, M.I.  YOB: 1950  Jluis Duke    Provider s Name:  [facility]   Medical Record No.  [id number]   Start of Care Date:  07/28/22   Onset Date:  07/12/22    Type:     _X__PT   ___OT Medical Diagnosis:    Encounter Diagnoses   Name Primary?    Aftercare following left knee joint replacement surgery Yes    S/P revision of total knee, left     Localized edema         Treatment Diagnosis:  s/p L TKA revision        Goals:     07/28/22 0500   Body Part   Goals listed below are for Left knee   Goal #1   Goal #1 ambulation   Previous Functional Level Minutes patient could walk   Performance Level at least 10 with cane or walker   Current Functional Level Minutes patient can walk   Performance Level 1-2 with FWW and antalgic patterning   STG Target Performance Minutes patient will be able to walk   Performance Level 5-10 with FWW and pain-free   Due Date 08/18/22    LTG Target Performance Minutes patient will be able to  walk   Performance Level at least 10 with least restrictive AD and pain-free   Rationale for safe household ambulation;for safe community ambulation;to promote a healthy and active lifestyle   Due Date 09/22/22   Goal #2   Goal #2 self cares/transfers/bed mobility   Previous Functional Level No restrictions;Able to transfer in and out of a bed;Able to transfer in and out of a car   Current Functional Level Unable ;to transfer in and out of a car;to transfer in and out of a bed   Performance Level requires assistance   LTG Target Performance Be able to ; transfer in and out of a car; transfer in and out of a bed   Performance level independently   Rationale for independent  community transportation;for independent living   Due Date 08/25/22       Therapy Frequency:  1x/week  Predicted Duration of Therapy Intervention:  8 weeks    Madina Vidal, PT                 I CERTIFY THE NEED FOR THESE SERVICES FURNISHED UNDER        THIS PLAN OF TREATMENT AND WHILE UNDER MY CARE     (Physician co-signature of this document indicates review and certification of the therapy plan).                     Certification Date From:  07/28/22   Certification Date To:  10/06/22    Referring Provider:  Fabiano Solorio    Initial Assessment        See Epic Evaluation SOC Date: 07/28/22

## 2022-07-29 NOTE — TELEPHONE ENCOUNTER
[facility] Call Center    Phone Message    May a detailed message be left on voicemail: yes     Reason for Call: Other: Patient left voicemail at Central Scheduling requesting a call back from [place] to discuss medication change/refill.      Action Taken: Message routed to:  [facility] ([facility]): Orthopedics    Travel Screening: Not Applicable

## 2022-07-29 NOTE — TELEPHONE ENCOUNTER
Over the patient states that the Dilaudid was not doing much for her pain control.  The ultrasound order was negative for DVT prophylaxis.  She denies any chest pain shortness  or shortness of breath.  She states she is unable to sleep at night.  I recommended we switch her to oxycodone, hydroxyzine and acetaminophen.  Hopefully this helps alleviate some of her pain.  I explained that sleep is always difficult following a major procedure.  This should help.  All questions were answered the patient was agreed with the plan.    Fabiano Solorio PA-C  7/29/2022 3:35 PM  Physician Assistant   Oncology and Adult Reconstructive Surgery  Dept Orthopaedic Surgery, [facility]

## 2022-08-02 DIAGNOSIS — Z96.652 S/P REVISION OF TOTAL KNEE, LEFT: Primary | ICD-10-CM

## 2022-08-03 ENCOUNTER — THERAPY VISIT (OUTPATIENT)
Dept: PHYSICAL THERAPY | Facility: CLINIC | Age: 72
End: 2022-08-03
Payer: COMMERCIAL

## 2022-08-03 DIAGNOSIS — Z47.1 AFTERCARE FOLLOWING LEFT KNEE JOINT REPLACEMENT SURGERY: Primary | ICD-10-CM

## 2022-08-03 DIAGNOSIS — Z96.652 AFTERCARE FOLLOWING LEFT KNEE JOINT REPLACEMENT SURGERY: Primary | ICD-10-CM

## 2022-08-03 PROCEDURE — 97530 THERAPEUTIC ACTIVITIES: CPT | Mod: GP | Performed by: PHYSICAL THERAPIST

## 2022-08-03 PROCEDURE — 97110 THERAPEUTIC EXERCISES: CPT | Mod: 59 | Performed by: PHYSICAL THERAPIST

## 2022-08-05 ASSESSMENT — KOOS JR: HOW SEVERE IS YOUR KNEE STIFFNESS AFTER FIRST WAKING IN MORNING: MODERATE

## 2022-08-05 ASSESSMENT — ACTIVITIES OF DAILY LIVING (ADL): FUNCTION,_DAILY_LIVING_SCORE: 54.41

## 2022-08-08 ENCOUNTER — OFFICE VISIT (OUTPATIENT)
Dept: ORTHOPEDICS | Facility: CLINIC | Age: 72
End: 2022-08-08
Payer: COMMERCIAL

## 2022-08-08 ENCOUNTER — ANCILLARY PROCEDURE (OUTPATIENT)
Dept: GENERAL RADIOLOGY | Facility: CLINIC | Age: 72
End: 2022-08-08
Attending: ORTHOPAEDIC SURGERY
Payer: COMMERCIAL

## 2022-08-08 VITALS — WEIGHT: 260 LBS | HEIGHT: 62 IN | BODY MASS INDEX: 47.84 KG/M2

## 2022-08-08 DIAGNOSIS — Z96.652 S/P REVISION OF TOTAL KNEE, LEFT: ICD-10-CM

## 2022-08-08 DIAGNOSIS — E66.01 MORBID OBESITY (H): ICD-10-CM

## 2022-08-08 DIAGNOSIS — Z96.652 S/P REVISION OF TOTAL KNEE, LEFT: Primary | ICD-10-CM

## 2022-08-08 PROCEDURE — 73560 X-RAY EXAM OF KNEE 1 OR 2: CPT | Mod: LT | Performed by: STUDENT IN AN ORGANIZED HEALTH CARE EDUCATION/TRAINING PROGRAM

## 2022-08-08 PROCEDURE — 99024 POSTOP FOLLOW-UP VISIT: CPT | Mod: GC | Performed by: ORTHOPAEDIC SURGERY

## 2022-08-08 NOTE — LETTER
8/8/2022         RE: Jluis Duke  [address]  [address]  [place] [address]        Dear Colleague,    Thank you for referring your patient, Jluis Duke, to the [facility] [place]. Please see a copy of my visit note below.        [facility]  Orthopaedic Surgery, Joint Replacement Consultation  by Minesh Mayo M.D.    Jluis Duke MRN# [id number]    YOB: 1950     Requesting physician: Olivia Boss MD [facility]     Background history:  DX:  1. Bilateral DJD knees  2. DM type 2  3. Obesity, Body mass index is 45.64 kg/m .    TREATMENTS:  1. 6/2/2008, Left TKA ([facility] (IMPLANTS: NexGen LPS flex size E left gender-specific femoral component.NexGen MIS tibial component size 3. 12 mm LPX flex polyethylene. 29 mm 3-peg, all-polyethylene patella.   2. 1/28/2008, Right TKA ([facility]  (IMPLANTS: NexGen LPS flex size E right femoral component.MIS NexGen tibial component size 4 precoat. NexGen LPS flex articular surface 10 mm in heights. All polyethylene NexGen patella size 29.   3. 7/12/2022, Left revision TKA, tibia only (Gael) [facility] . Cultures (-)    Postoperative knee replacement follow-up clinic visit    Jluis Duke is doing well after last surgery listed above.    Preoperative knee pain is  Was resolved   Still has some pain at night when trying to sleep.  Analgesic medication: Oxycodone, just finished  She is still using a walker, but is starting to walk without it at home.  She is overall happy with her progress.   She is working with PT, with flexion up to 110. She has 4 more appointments left with PT  Denies any fevers or chills. Denies any numbness/tingling    EXAM:  Incision healing, clean and dry. No erythema or drainage.  Joint range of motion 110 degrees, pain free  Effusion: none  Periarticular swelling or leg edema: edema in the leg  Peroneal and tibial  nerve function: active  Gait: Antalgic    Intraoperative cultures:  all negative     IMAGING:  Radiographs demonstrate the knee implant in satisfactory position without evidence of any complication.    IMPRESSION:  Excellent outcome to date after knee replacement.     PLAN:    Continue range of motion exercises and stretching.    Quadriceps strengthening exercises.    May be weight bearing as tolerated.    Discontinue DVT prophylaxis meds (i.e., warfarin or ASA) if not required for any other reason.    Refill of:  (meds listed above): not needed.    Patient given instructions re: prophylactic antibiotic recommendations during dental work.    Next follow-up visit at 1 year after surgery or sooner as needed.       Kristi Bailey MD  Adult Reconstructive Surgery Fellow  Department of Orthopaedic Surgery, [facility]    Attending MD (Dr. Minesh Mayo) Attestation :  This patient was seen and evaluated by me including a history, exam, and interpretation of all imaging and/or lab data.  I formulated the treatment plan along with either a physician's assistant (STAR) or training physician (resident/fellow), who also saw the patient. That individual or a scribe has documented the visit in the attached note which I approve.    MD Mauro Avilez [facility] Professor  Oncology and Adult Reconstructive Surgery  Dept Orthopaedic Surgery, [facility]  [phone] office, [phone] pager  [email or web address]          KOOS Knee Survey Assessment    Knee Outcome Survey ADL Scale (Alia, JONATHON; Aaliyah, L; Yuni, RS; Talib, FH; Janet, CD; 1998) 8/5/2022   Pain (ADLS1) -   Stiffness (ADLS2) -   Swelling (ADLS3) -   Giving Way, Buckling or Shifting of Knee (ADLS4) -   Weakness (ADLS5) -   Limping (ADLS6) -   Walk? (ADLS7) -   Go up stairs? (ADLS8) -   Go down stairs? (ADLS9) -   Stand? (ADLS10) -   Kneel on the front of your knee? (ADLS11) -   Squat? (ADLS12) -   Sit with your knee bent? (ADLS13) -   How would  you rate the current function of your knee during your usual daily activities on a scale from 0 to 100 with 100 being your level of knee function prior to your injury and 0 being the inability to perform any of your usual daily activities? -   How would you rate the overall function of your knee during your usual daily activities?  (please check the one response that best describes you) -   As a result of your knee injury, how would you rate your current level of daily activity? (please check the one response that best describes you) -   Sum -   Count -   Raw Score -   Knee Activity of Daily Living Score -   Do you have swelling in your knee? Always   Do you feel grinding, hear clicking or any other type of noise when your knee moves? Never   Does your knee catch or hang up when moving? Never   Can you straighten your knee fully? Sometimes   Can you bend your knee fully? Often   How severe is your knee joint stiffness after first wakening in the morning? Moderate   How severe is your knee stiffness after sitting, lying or resting LATER IN THE DAY? Moderate   How often do you experience knee pain? Daily   Twisting/pivoting on your knee Moderate   Straightening knee fully Mild   Bending knee fully Moderate   Walking on flat surface Mild   Going up or down stairs Severe   At night while in bed Moderate   Sitting or lying Mild   Standing upright Mild   Descending stairs Moderate   Ascending stairs Extreme   Rising from sitting Moderate   Standing Moderate   Bending to floor/ an object Mild   Walking on flat surface Mild   Getting in/out of car Severe   Going shopping Moderate   Putting on socks/stockings Moderate   Rising from bed Mild   Taking off socks/stockings Mild   Lying in bed (turning over, maintaining knee position) Severe   Getting in/out of bath Moderate   Sitting Mild   Getting on/off toilet Mild   Heavy domestic duties (moving heavy boxes, scrubbing floors, etc) Moderate   Light domestic duties  (cooking, dusting, etc) Mild   Squatting Moderate   Running Extreme   Jumping Extreme   Twisting/pivoting on your injured knee Moderate   Kneeling Extreme   How often are you aware of your knee problem? Constantly   Have you modified your life style to avoid potentially damaging activities to your knee? Moderately   How much are you troubled with lack of confidence in your knee? Severely   In general, how much difficulty do you have with your knee? Severe   Pain Score 55.56   Symptoms Score 53.57   Function, Daily Living Score 54.41   Sports and Rec Score 20   Quality of Life Score 25                          Again, thank you for allowing me to participate in the care of your patient.        Sincerely,        Minesh Mayo MD

## 2022-08-08 NOTE — PROGRESS NOTES
[facility]  Orthopaedic Surgery, Joint Replacement Consultation  by Minesh Mayo M.D.    Jluis Duke MRN# [id number]    YOB: 1950     Requesting physician: Olivia Boss MD [facility]     Background history:  DX:  1. Bilateral DJD knees  2. DM type 2  3. Obesity, Body mass index is 45.64 kg/m .    TREATMENTS:  1. 6/2/2008, [facility] ([facility] (IMPLANTS: NexGen LPS flex size E left gender-specific femoral component.NexGen MIS tibial component size 3. 12 mm LPX flex polyethylene. 29 mm 3-peg, all-polyethylene patella.   2. 1/28/2008, Right [facility] ([facility]  (IMPLANTS: NexGen LPS flex size E right femoral component.MIS NexGen tibial component size 4 precoat. NexGen LPS flex articular surface 10 mm in heights. All polyethylene NexGen patella size 29.   3. 7/12/2022, Left revision TKA, tibia only (Gael) [facility] . Cultures (-)    Postoperative knee replacement follow-up clinic visit    Jluis Duke is doing well after last surgery listed above.    Preoperative knee pain is  Was resolved   Still has some pain at night when trying to sleep.  Analgesic medication: Oxycodone, just finished  She is still using a walker, but is starting to walk without it at home.  She is overall happy with her progress.   She is working with PT, with flexion up to 110. She has 4 more appointments left with PT  Denies any fevers or chills. Denies any numbness/tingling    EXAM:  Incision healing, clean and dry. No erythema or drainage.  Joint range of motion 110 degrees, pain free  Effusion: none  Periarticular swelling or leg edema: edema in the leg  Peroneal and tibial nerve function: active  Gait: Antalgic    Intraoperative cultures:  all negative     IMAGING:  Radiographs demonstrate the knee implant in satisfactory position without evidence of any complication.    IMPRESSION:  Excellent outcome to date after knee replacement.      PLAN:    Continue range of motion exercises and stretching.    Quadriceps strengthening exercises.    May be weight bearing as tolerated.    Discontinue DVT prophylaxis meds (i.e., warfarin or ASA) if not required for any other reason.    Refill of:  (meds listed above): not needed.    Patient given instructions re: prophylactic antibiotic recommendations during dental work.    Next follow-up visit at 1 year after surgery or sooner as needed.       Kristi Bailey MD  Adult Reconstructive Surgery Fellow  Department of Orthopaedic Surgery, [facility]    Attending MD (Dr. Minesh Mayo) Attestation :  This patient was seen and evaluated by me including a history, exam, and interpretation of all imaging and/or lab data.  I formulated the treatment plan along with either a physician's assistant (STAR) or training physician (resident/fellow), who also saw the patient. That individual or a scribe has documented the visit in the attached note which I approve.    MD Mauro Avilez Professor  Oncology and Adult Reconstructive Surgery  Dept Orthopaedic Surgery, [facility]  [phone] office, [phone] pager  [email or web address]          KOOS Knee Survey Assessment    Knee Outcome Survey ADL Scale (Alia, JGARRY; Aaliyah, L; Yuni, RS; Talib, FH; Janet, CD; 1998) 8/5/2022   Pain (ADLS1) -   Stiffness (ADLS2) -   Swelling (ADLS3) -   Giving Way, Buckling or Shifting of Knee (ADLS4) -   Weakness (ADLS5) -   Limping (ADLS6) -   Walk? (ADLS7) -   Go up stairs? (ADLS8) -   Go down stairs? (ADLS9) -   Stand? (ADLS10) -   Kneel on the front of your knee? (ADLS11) -   Squat? (ADLS12) -   Sit with your knee bent? (ADLS13) -   How would you rate the current function of your knee during your usual daily activities on a scale from 0 to 100 with 100 being your level of knee function prior to your injury and 0 being the inability to perform any of your usual daily activities? -   How would you rate  the overall function of your knee during your usual daily activities?  (please check the one response that best describes you) -   As a result of your knee injury, how would you rate your current level of daily activity? (please check the one response that best describes you) -   Sum -   Count -   Raw Score -   Knee Activity of Daily Living Score -   Do you have swelling in your knee? Always   Do you feel grinding, hear clicking or any other type of noise when your knee moves? Never   Does your knee catch or hang up when moving? Never   Can you straighten your knee fully? Sometimes   Can you bend your knee fully? Often   How severe is your knee joint stiffness after first wakening in the morning? Moderate   How severe is your knee stiffness after sitting, lying or resting LATER IN THE DAY? Moderate   How often do you experience knee pain? Daily   Twisting/pivoting on your knee Moderate   Straightening knee fully Mild   Bending knee fully Moderate   Walking on flat surface Mild   Going up or down stairs Severe   At night while in bed Moderate   Sitting or lying Mild   Standing upright Mild   Descending stairs Moderate   Ascending stairs Extreme   Rising from sitting Moderate   Standing Moderate   Bending to floor/ an object Mild   Walking on flat surface Mild   Getting in/out of car Severe   Going shopping Moderate   Putting on socks/stockings Moderate   Rising from bed Mild   Taking off socks/stockings Mild   Lying in bed (turning over, maintaining knee position) Severe   Getting in/out of bath Moderate   Sitting Mild   Getting on/off toilet Mild   Heavy domestic duties (moving heavy boxes, scrubbing floors, etc) Moderate   Light domestic duties (cooking, dusting, etc) Mild   Squatting Moderate   Running Extreme   Jumping Extreme   Twisting/pivoting on your injured knee Moderate   Kneeling Extreme   How often are you aware of your knee problem? Constantly   Have you modified your life style to avoid  potentially damaging activities to your knee? Moderately   How much are you troubled with lack of confidence in your knee? Severely   In general, how much difficulty do you have with your knee? Severe   Pain Score 55.56   Symptoms Score 53.57   Function, Daily Living Score 54.41   Sports and Rec Score 20   Quality of Life Score 25

## 2022-08-08 NOTE — LETTER
8/8/2022         RE: Jluis Duke  [address]  [address]  [place] [address]        Dear Colleague,    Thank you for referring your patient, Jluis Duke, to the [facility] [place]. Please see a copy of my visit note below.        [facility]  Orthopaedic Surgery, Joint Replacement Consultation  by Minesh Mayo M.D.    lJuis Duke MRN# [id number]    YOB: 1950     Requesting physician: Olivia Boss MD [facility]     Background history:  DX:  1. Bilateral DJD knees  2. DM type 2  3. Obesity, Body mass index is 45.64 kg/m .    TREATMENTS:  1. 6/2/2008, Left TKA ([facility] (IMPLANTS: NexGen LPS flex size E left gender-specific femoral component.NexGen MIS tibial component size 3. 12 mm LPX flex polyethylene. 29 mm 3-peg, all-polyethylene patella.   2. 1/28/2008, Right TKA ([facility]  (IMPLANTS: NexGen LPS flex size E right femoral component.MIS NexGen tibial component size 4 precoat. NexGen LPS flex articular surface 10 mm in heights. All polyethylene NexGen patella size 29.   3. 7/12/2022, Left revision TKA, tibia only (Gael) [facility] . Cultures (-)    Postoperative knee replacement follow-up clinic visit    Jluis Duke is doing well after last surgery listed above.    Preoperative knee pain is  Was resolved   Still has some pain at night when trying to sleep.  Analgesic medication: Oxycodone, just finished  She is still using a walker, but is starting to walk without it at home.  She is overall happy with her progress.   She is working with PT, with flexion up to 110. She has 4 more appointments left with PT  Denies any fevers or chills. Denies any numbness/tingling    EXAM:  Incision healing, clean and dry. No erythema or drainage.  Joint range of motion 110 degrees, pain free  Effusion: none  Periarticular swelling or leg edema: edema in the leg  Peroneal and tibial  nerve function: active  Gait: Antalgic    Intraoperative cultures:  all negative     IMAGING:  Radiographs demonstrate the knee implant in satisfactory position without evidence of any complication.    IMPRESSION:  Excellent outcome to date after knee replacement.     PLAN:    Continue range of motion exercises and stretching.    Quadriceps strengthening exercises.    May be weight bearing as tolerated.    Discontinue DVT prophylaxis meds (i.e., warfarin or ASA) if not required for any other reason.    Refill of:  (meds listed above): not needed.    Patient given instructions re: prophylactic antibiotic recommendations during dental work.    Next follow-up visit at 1 year after surgery or sooner as needed.       Kristi Bailey MD  Adult Reconstructive Surgery Fellow  Department of Orthopaedic Surgery, [facility]    Attending MD (Dr. Minesh Mayo) Attestation :  This patient was seen and evaluated by me including a history, exam, and interpretation of all imaging and/or lab data.  I formulated the treatment plan along with either a physician's assistant (STAR) or training physician (resident/fellow), who also saw the patient. That individual or a scribe has documented the visit in the attached note which I approve.    MD Mauro Avilez [facility] Professor  Oncology and Adult Reconstructive Surgery  Dept Orthopaedic Surgery, [facility]  [phone] office, [phone] pager  [email or web address]    KOOS Knee Survey Assessment    Knee Outcome Survey ADL Scale (Alia, JONATHON; Aaliyah, L; Yuni, RS; Talib, FH; Janet, CD; 1998) 8/5/2022   Pain (ADLS1) -   Stiffness (ADLS2) -   Swelling (ADLS3) -   Giving Way, Buckling or Shifting of Knee (ADLS4) -   Weakness (ADLS5) -   Limping (ADLS6) -   Walk? (ADLS7) -   Go up stairs? (ADLS8) -   Go down stairs? (ADLS9) -   Stand? (ADLS10) -   Kneel on the front of your knee? (ADLS11) -   Squat? (ADLS12) -   Sit with your knee bent? (ADLS13) -   How would you  rate the current function of your knee during your usual daily activities on a scale from 0 to 100 with 100 being your level of knee function prior to your injury and 0 being the inability to perform any of your usual daily activities? -   How would you rate the overall function of your knee during your usual daily activities?  (please check the one response that best describes you) -   As a result of your knee injury, how would you rate your current level of daily activity? (please check the one response that best describes you) -   Sum -   Count -   Raw Score -   Knee Activity of Daily Living Score -   Do you have swelling in your knee? Always   Do you feel grinding, hear clicking or any other type of noise when your knee moves? Never   Does your knee catch or hang up when moving? Never   Can you straighten your knee fully? Sometimes   Can you bend your knee fully? Often   How severe is your knee joint stiffness after first wakening in the morning? Moderate   How severe is your knee stiffness after sitting, lying or resting LATER IN THE DAY? Moderate   How often do you experience knee pain? Daily   Twisting/pivoting on your knee Moderate   Straightening knee fully Mild   Bending knee fully Moderate   Walking on flat surface Mild   Going up or down stairs Severe   At night while in bed Moderate   Sitting or lying Mild   Standing upright Mild   Descending stairs Moderate   Ascending stairs Extreme   Rising from sitting Moderate   Standing Moderate   Bending to floor/ an object Mild   Walking on flat surface Mild   Getting in/out of car Severe   Going shopping Moderate   Putting on socks/stockings Moderate   Rising from bed Mild   Taking off socks/stockings Mild   Lying in bed (turning over, maintaining knee position) Severe   Getting in/out of bath Moderate   Sitting Mild   Getting on/off toilet Mild   Heavy domestic duties (moving heavy boxes, scrubbing floors, etc) Moderate   Light domestic duties (cooking,  dusting, etc) Mild   Squatting Moderate   Running Extreme   Jumping Extreme   Twisting/pivoting on your injured knee Moderate   Kneeling Extreme   How often are you aware of your knee problem? Constantly   Have you modified your life style to avoid potentially damaging activities to your knee? Moderately   How much are you troubled with lack of confidence in your knee? Severely   In general, how much difficulty do you have with your knee? Severe   Pain Score 55.56   Symptoms Score 53.57   Function, Daily Living Score 54.41   Sports and Rec Score 20   Quality of Life Score 25

## 2022-08-10 ENCOUNTER — THERAPY VISIT (OUTPATIENT)
Dept: PHYSICAL THERAPY | Facility: CLINIC | Age: 72
End: 2022-08-10
Payer: COMMERCIAL

## 2022-08-10 DIAGNOSIS — Z96.652 S/P REVISION OF TOTAL KNEE, LEFT: ICD-10-CM

## 2022-08-10 DIAGNOSIS — R60.0 LOCALIZED EDEMA: ICD-10-CM

## 2022-08-10 DIAGNOSIS — Z96.652 AFTERCARE FOLLOWING LEFT KNEE JOINT REPLACEMENT SURGERY: Primary | ICD-10-CM

## 2022-08-10 DIAGNOSIS — Z47.1 AFTERCARE FOLLOWING LEFT KNEE JOINT REPLACEMENT SURGERY: Primary | ICD-10-CM

## 2022-08-10 PROCEDURE — 97110 THERAPEUTIC EXERCISES: CPT | Mod: GP

## 2022-08-10 PROCEDURE — 97530 THERAPEUTIC ACTIVITIES: CPT | Mod: GP

## 2022-08-10 PROCEDURE — 97140 MANUAL THERAPY 1/> REGIONS: CPT | Mod: GP

## 2022-08-24 ENCOUNTER — THERAPY VISIT (OUTPATIENT)
Dept: PHYSICAL THERAPY | Facility: CLINIC | Age: 72
End: 2022-08-24
Payer: COMMERCIAL

## 2022-08-24 DIAGNOSIS — Z96.652 AFTERCARE FOLLOWING LEFT KNEE JOINT REPLACEMENT SURGERY: Primary | ICD-10-CM

## 2022-08-24 DIAGNOSIS — Z47.1 AFTERCARE FOLLOWING LEFT KNEE JOINT REPLACEMENT SURGERY: Primary | ICD-10-CM

## 2022-08-24 PROCEDURE — 97110 THERAPEUTIC EXERCISES: CPT | Mod: GP | Performed by: PHYSICAL THERAPIST

## 2022-08-27 ENCOUNTER — HEALTH MAINTENANCE LETTER (OUTPATIENT)
Age: 72
End: 2022-08-27

## 2022-08-31 ENCOUNTER — THERAPY VISIT (OUTPATIENT)
Dept: PHYSICAL THERAPY | Facility: CLINIC | Age: 72
End: 2022-08-31
Payer: COMMERCIAL

## 2022-08-31 DIAGNOSIS — Z47.1 AFTERCARE FOLLOWING LEFT KNEE JOINT REPLACEMENT SURGERY: Primary | ICD-10-CM

## 2022-08-31 DIAGNOSIS — Z96.652 AFTERCARE FOLLOWING LEFT KNEE JOINT REPLACEMENT SURGERY: Primary | ICD-10-CM

## 2022-08-31 PROCEDURE — 97110 THERAPEUTIC EXERCISES: CPT | Mod: GP | Performed by: PHYSICAL THERAPIST

## 2022-08-31 PROCEDURE — 97112 NEUROMUSCULAR REEDUCATION: CPT | Mod: GP | Performed by: PHYSICAL THERAPIST

## 2022-09-07 ENCOUNTER — THERAPY VISIT (OUTPATIENT)
Dept: PHYSICAL THERAPY | Facility: CLINIC | Age: 72
End: 2022-09-07
Payer: COMMERCIAL

## 2022-09-07 DIAGNOSIS — Z96.652 AFTERCARE FOLLOWING LEFT KNEE JOINT REPLACEMENT SURGERY: Primary | ICD-10-CM

## 2022-09-07 DIAGNOSIS — Z47.1 AFTERCARE FOLLOWING LEFT KNEE JOINT REPLACEMENT SURGERY: Primary | ICD-10-CM

## 2022-09-07 PROCEDURE — 97110 THERAPEUTIC EXERCISES: CPT | Mod: GP | Performed by: PHYSICAL THERAPIST

## 2022-09-21 ENCOUNTER — THERAPY VISIT (OUTPATIENT)
Dept: PHYSICAL THERAPY | Facility: CLINIC | Age: 72
End: 2022-09-21
Payer: COMMERCIAL

## 2022-09-21 DIAGNOSIS — Z47.1 AFTERCARE FOLLOWING LEFT KNEE JOINT REPLACEMENT SURGERY: Primary | ICD-10-CM

## 2022-09-21 DIAGNOSIS — Z96.652 AFTERCARE FOLLOWING LEFT KNEE JOINT REPLACEMENT SURGERY: Primary | ICD-10-CM

## 2022-09-21 PROCEDURE — 97112 NEUROMUSCULAR REEDUCATION: CPT | Mod: GP | Performed by: PHYSICAL THERAPIST

## 2022-09-21 PROCEDURE — 97110 THERAPEUTIC EXERCISES: CPT | Mod: GP | Performed by: PHYSICAL THERAPIST

## 2022-12-26 ENCOUNTER — HEALTH MAINTENANCE LETTER (OUTPATIENT)
Age: 72
End: 2022-12-26

## 2023-07-10 ENCOUNTER — TELEPHONE (OUTPATIENT)
Dept: ORTHOPEDICS | Facility: CLINIC | Age: 73
End: 2023-07-10

## 2023-07-10 NOTE — TELEPHONE ENCOUNTER
VM left requesting a return call to schedule a follow up appointment with Dr. Mayo.    Lyudmila Montesinos LPN

## 2023-09-17 ENCOUNTER — HEALTH MAINTENANCE LETTER (OUTPATIENT)
Age: 73
End: 2023-09-17

## 2023-10-02 ENCOUNTER — TELEPHONE (OUTPATIENT)
Dept: ORTHOPEDICS | Facility: CLINIC | Age: 73
End: 2023-10-02
Payer: COMMERCIAL

## 2023-10-04 DIAGNOSIS — Z96.652 S/P REVISION OF TOTAL KNEE, LEFT: Primary | ICD-10-CM

## 2023-10-04 DIAGNOSIS — M25.561 RIGHT KNEE PAIN: ICD-10-CM

## 2023-11-27 ENCOUNTER — TRANSCRIBE ORDERS (OUTPATIENT)
Dept: OTHER | Age: 73
End: 2023-11-27

## 2023-11-27 DIAGNOSIS — G89.29 CHRONIC BILATERAL LOW BACK PAIN WITHOUT SCIATICA: Primary | ICD-10-CM

## 2023-11-27 DIAGNOSIS — M54.50 CHRONIC BILATERAL LOW BACK PAIN WITHOUT SCIATICA: Primary | ICD-10-CM

## 2024-02-09 ENCOUNTER — TELEPHONE (OUTPATIENT)
Dept: ORTHOPEDICS | Facility: CLINIC | Age: 74
End: 2024-02-09
Payer: COMMERCIAL

## 2024-02-09 NOTE — TELEPHONE ENCOUNTER
[facility] Call Center    Phone Message    May a detailed message be left on voicemail: yes     Reason for Call: Other: Patient is wondering if she can also be seen for her right knee on 03/11 and also a possible xray.     Action Taken: Message routed to:  [facility] Orthopedics    Travel Screening: Not Applicable

## 2024-11-10 ENCOUNTER — HEALTH MAINTENANCE LETTER (OUTPATIENT)
Age: 74
End: 2024-11-10

## (undated) DEVICE — GLOVE PROTEXIS W/NEU-THERA 7.0  2D73TE70

## (undated) DEVICE — TOOL DISSECT MIDAS MR8 9CM OVAL 5.5MM DIA MR8-9OV55

## (undated) DEVICE — DRAPE CONVERTORS U-DRAPE 60X72" 8476

## (undated) DEVICE — BASIN SET MAJOR

## (undated) DEVICE — DRSG TEGADERM ALGINATE AG 4X5" 90303

## (undated) DEVICE — Device

## (undated) DEVICE — SOL WATER IRRIG 1000ML BOTTLE 07139-09

## (undated) DEVICE — SU MONOCRYL 3-0 PS-2 18" UND Y497G

## (undated) DEVICE — LINEN GOWN OVERSIZE 5408

## (undated) DEVICE — BLADE KNIFE SURG 15 371115

## (undated) DEVICE — FASTENER CATH STAYFIX 685ME

## (undated) DEVICE — BLADE CLIPPER SGL USE 9680

## (undated) DEVICE — ADH SKIN CLOSURE PREMIERPRO EXOFIN 1.0ML 3470

## (undated) DEVICE — DRSG TEGADERM 4X10" 1627

## (undated) DEVICE — GLOVE PROTEXIS POWDER FREE 8.5 ORTHOPEDIC 2D73ET85

## (undated) DEVICE — LINEN TOWEL PACK X5 5464

## (undated) DEVICE — SPONGE LAP 12X12" X8425

## (undated) DEVICE — SU VICRYL 2-0 CT-1 27" UND J259H

## (undated) DEVICE — DECANTER VIAL 2006S

## (undated) DEVICE — BAG TRANSPORT RED LINE RECLOSABLE 15X12" MGRL2P1215

## (undated) DEVICE — GLOVE PROTEXIS BLUE W/NEU-THERA 8.5  2D73EB85

## (undated) DEVICE — SU VICRYL 0 CTX 36" J370H

## (undated) DEVICE — SU VICRYL 0 CT-1 36" J946H

## (undated) DEVICE — SOL WATER IRRIG 1000ML BOTTLE 2F7114

## (undated) DEVICE — PREP SKIN SCRUB TRAY 4461A

## (undated) DEVICE — SUCTION MANIFOLD NEPTUNE 2 SYS 4 PORT 0702-020-000

## (undated) DEVICE — BONE CLEANING TIP INTERPULSE  0210-010-000

## (undated) DEVICE — LINEN BACK PACK 5440

## (undated) DEVICE — SPECIMEN CONTAINER 5OZ STERILE 2600SA

## (undated) DEVICE — PREP DURAPREP 26ML APL 8630

## (undated) DEVICE — BONE CEMENT MIXEVAC HI VAC W/CARTRIDGE 0306-563-000

## (undated) DEVICE — PREP POVIDONE-IODINE 7.5% SCRUB 4OZ BOTTLE MDS093945

## (undated) DEVICE — SOL NACL 0.9% IRRIG 1000ML BOTTLE 2F7124

## (undated) DEVICE — DRAPE STERI TOWEL LG 1010

## (undated) DEVICE — TAPE DURAPORE 3" SILK 1538-3

## (undated) DEVICE — LINEN GOWN X4 5410

## (undated) DEVICE — GOWN IMPERVIOUS SPECIALTY XLG/XLONG 32474

## (undated) DEVICE — BLADE KNIFE SURG 10 371110

## (undated) DEVICE — SUCTION IRR SYSTEM W/O TIP INTERPULSE HANDPIECE 0210-100-000

## (undated) DEVICE — STRAP KNEE/BODY 31143004

## (undated) DEVICE — SOL NACL 0.9% IRRIG 3000ML BAG 2B7477

## (undated) DEVICE — SOL NACL 0.9% IRRIG 1000ML BOTTLE 07138-09

## (undated) DEVICE — KIT CULTURE TRANSPORT SYS A.C.T. II DUAL ANEROBE R124022

## (undated) DEVICE — BLADE SAW SAGITTAL STRK 25X90X1.27MM HD SYS 6 6125-127-090

## (undated) DEVICE — DRAPE STOCKINETTE 8" 8586

## (undated) DEVICE — GLOVE PROTEXIS BLUE W/NEU-THERA 7.5  2D73EB75

## (undated) DEVICE — TOURNIQUET CUFF 34" REPRO BROWN 60-7070-106

## (undated) RX ORDER — TROPICAMIDE 10 MG/ML
SOLUTION/ DROPS OPHTHALMIC
Status: DISPENSED
Start: 2021-03-01

## (undated) RX ORDER — CELECOXIB 200 MG/1
CAPSULE ORAL
Status: DISPENSED
Start: 2022-07-12

## (undated) RX ORDER — CEFAZOLIN SODIUM/WATER 2 G/20 ML
SYRINGE (ML) INTRAVENOUS
Status: DISPENSED
Start: 2022-07-12

## (undated) RX ORDER — VANCOMYCIN HYDROCHLORIDE 1 G/20ML
INJECTION, POWDER, LYOPHILIZED, FOR SOLUTION INTRAVENOUS
Status: DISPENSED
Start: 2022-07-12

## (undated) RX ORDER — HYDROMORPHONE HYDROCHLORIDE 1 MG/ML
INJECTION, SOLUTION INTRAMUSCULAR; INTRAVENOUS; SUBCUTANEOUS
Status: DISPENSED
Start: 2022-07-12

## (undated) RX ORDER — METHOCARBAMOL 500 MG/1
TABLET, FILM COATED ORAL
Status: DISPENSED
Start: 2022-07-12

## (undated) RX ORDER — TROPICAMIDE 10 MG/ML
SOLUTION/ DROPS OPHTHALMIC
Status: DISPENSED
Start: 2021-02-10

## (undated) RX ORDER — HYDROXYZINE HYDROCHLORIDE 10 MG/1
TABLET, FILM COATED ORAL
Status: DISPENSED
Start: 2022-07-12

## (undated) RX ORDER — FENTANYL CITRATE 50 UG/ML
INJECTION, SOLUTION INTRAMUSCULAR; INTRAVENOUS
Status: DISPENSED
Start: 2022-07-12

## (undated) RX ORDER — DEXAMETHASONE SODIUM PHOSPHATE 4 MG/ML
INJECTION, SOLUTION INTRA-ARTICULAR; INTRALESIONAL; INTRAMUSCULAR; INTRAVENOUS; SOFT TISSUE
Status: DISPENSED
Start: 2022-07-12

## (undated) RX ORDER — ACETAMINOPHEN 325 MG/1
TABLET ORAL
Status: DISPENSED
Start: 2022-07-12

## (undated) RX ORDER — OXYCODONE HYDROCHLORIDE 5 MG/1
TABLET ORAL
Status: DISPENSED
Start: 2022-07-12

## (undated) RX ORDER — LIDOCAINE HYDROCHLORIDE 20 MG/ML
INJECTION, SOLUTION EPIDURAL; INFILTRATION; INTRACAUDAL; PERINEURAL
Status: DISPENSED
Start: 2022-07-12

## (undated) RX ORDER — ONDANSETRON 2 MG/ML
INJECTION INTRAMUSCULAR; INTRAVENOUS
Status: DISPENSED
Start: 2022-07-12

## (undated) RX ORDER — TRANEXAMIC ACID 650 MG/1
TABLET ORAL
Status: DISPENSED
Start: 2022-07-12